# Patient Record
Sex: FEMALE | Race: WHITE | NOT HISPANIC OR LATINO | Employment: OTHER | URBAN - NONMETROPOLITAN AREA
[De-identification: names, ages, dates, MRNs, and addresses within clinical notes are randomized per-mention and may not be internally consistent; named-entity substitution may affect disease eponyms.]

---

## 2022-07-07 ENCOUNTER — OFFICE VISIT (OUTPATIENT)
Dept: FAMILY MEDICINE CLINIC | Facility: CLINIC | Age: 67
End: 2022-07-07

## 2022-07-07 VITALS
RESPIRATION RATE: 16 BRPM | BODY MASS INDEX: 30.87 KG/M2 | HEART RATE: 72 BPM | HEIGHT: 63 IN | WEIGHT: 174.2 LBS | DIASTOLIC BLOOD PRESSURE: 90 MMHG | TEMPERATURE: 97.8 F | OXYGEN SATURATION: 97 % | SYSTOLIC BLOOD PRESSURE: 124 MMHG

## 2022-07-07 DIAGNOSIS — Z12.11 ENCOUNTER FOR SCREENING FOR MALIGNANT NEOPLASM OF COLON: ICD-10-CM

## 2022-07-07 DIAGNOSIS — N02.8 IGA NEPHROPATHY: ICD-10-CM

## 2022-07-07 DIAGNOSIS — Z13.820 SCREENING FOR OSTEOPOROSIS: ICD-10-CM

## 2022-07-07 DIAGNOSIS — Z12.31 ENCOUNTER FOR SCREENING MAMMOGRAM FOR BREAST CANCER: ICD-10-CM

## 2022-07-07 DIAGNOSIS — E78.5 DYSLIPIDEMIA: ICD-10-CM

## 2022-07-07 DIAGNOSIS — Z00.00 INITIAL MEDICARE ANNUAL WELLNESS VISIT: Primary | ICD-10-CM

## 2022-07-07 DIAGNOSIS — F51.01 PRIMARY INSOMNIA: ICD-10-CM

## 2022-07-07 DIAGNOSIS — N39.46 MIXED STRESS AND URGE INCONTINENCE: ICD-10-CM

## 2022-07-07 DIAGNOSIS — F43.23 ADJUSTMENT DISORDER WITH MIXED ANXIETY AND DEPRESSED MOOD: ICD-10-CM

## 2022-07-07 DIAGNOSIS — N32.81 OVERACTIVE BLADDER: ICD-10-CM

## 2022-07-07 DIAGNOSIS — F39 MOOD DISORDER: ICD-10-CM

## 2022-07-07 DIAGNOSIS — R30.0 DYSURIA: ICD-10-CM

## 2022-07-07 DIAGNOSIS — M85.88 OSTEOPENIA OF LUMBAR SPINE: ICD-10-CM

## 2022-07-07 PROBLEM — N02.B9 IGA NEPHROPATHY: Status: ACTIVE | Noted: 2022-07-07

## 2022-07-07 LAB
BILIRUB BLD-MCNC: NEGATIVE MG/DL
CLARITY, POC: ABNORMAL
COLOR UR: ABNORMAL
EXPIRATION DATE: ABNORMAL
GLUCOSE UR STRIP-MCNC: NEGATIVE MG/DL
KETONES UR QL: NEGATIVE
LEUKOCYTE EST, POC: NEGATIVE
Lab: ABNORMAL
NITRITE UR-MCNC: NEGATIVE MG/ML
PH UR: 6 [PH] (ref 5–8)
PROT UR STRIP-MCNC: ABNORMAL MG/DL
RBC # UR STRIP: NEGATIVE /UL
SP GR UR: 1.02 (ref 1–1.03)
UROBILINOGEN UR QL: NORMAL

## 2022-07-07 PROCEDURE — G0438 PPPS, INITIAL VISIT: HCPCS | Performed by: FAMILY MEDICINE

## 2022-07-07 PROCEDURE — 1159F MED LIST DOCD IN RCRD: CPT | Performed by: FAMILY MEDICINE

## 2022-07-07 PROCEDURE — 96160 PT-FOCUSED HLTH RISK ASSMT: CPT | Performed by: FAMILY MEDICINE

## 2022-07-07 PROCEDURE — 1170F FXNL STATUS ASSESSED: CPT | Performed by: FAMILY MEDICINE

## 2022-07-07 PROCEDURE — 81003 URINALYSIS AUTO W/O SCOPE: CPT | Performed by: FAMILY MEDICINE

## 2022-07-07 PROCEDURE — 99203 OFFICE O/P NEW LOW 30 MIN: CPT | Performed by: FAMILY MEDICINE

## 2022-07-07 RX ORDER — LAMOTRIGINE 100 MG/1
100 TABLET ORAL 2 TIMES DAILY
COMMUNITY
End: 2022-07-07 | Stop reason: SDUPTHER

## 2022-07-07 RX ORDER — LAMOTRIGINE 100 MG/1
100 TABLET ORAL 2 TIMES DAILY
Qty: 180 TABLET | Refills: 3 | Status: SHIPPED | OUTPATIENT
Start: 2022-07-07 | End: 2022-10-10 | Stop reason: SDUPTHER

## 2022-07-07 RX ORDER — TEMAZEPAM 30 MG/1
30 CAPSULE ORAL NIGHTLY PRN
COMMUNITY
End: 2022-07-07 | Stop reason: SDUPTHER

## 2022-07-07 RX ORDER — EZETIMIBE 10 MG/1
10 TABLET ORAL DAILY
COMMUNITY
End: 2022-07-07 | Stop reason: SDUPTHER

## 2022-07-07 RX ORDER — TRAZODONE HYDROCHLORIDE 100 MG/1
100 TABLET ORAL NIGHTLY
COMMUNITY
End: 2022-07-07

## 2022-07-07 RX ORDER — EZETIMIBE 10 MG/1
10 TABLET ORAL DAILY
Qty: 90 TABLET | Refills: 3 | Status: SHIPPED | OUTPATIENT
Start: 2022-07-07

## 2022-07-07 RX ORDER — TEMAZEPAM 30 MG/1
30 CAPSULE ORAL NIGHTLY PRN
Qty: 90 CAPSULE | Refills: 1 | Status: SHIPPED | OUTPATIENT
Start: 2022-07-07 | End: 2022-12-27

## 2022-07-07 RX ORDER — ESCITALOPRAM OXALATE 10 MG/1
10 TABLET ORAL DAILY
Qty: 90 TABLET | Refills: 3 | Status: SHIPPED | OUTPATIENT
Start: 2022-07-07

## 2022-07-07 NOTE — PATIENT INSTRUCTIONS
Advance Care Planning and Advance Directives     You make decisions on a daily basis - decisions about where you want to live, your career, your home, your life. Perhaps one of the most important decisions you face is your choice for future medical care. Take time to talk with your family and your healthcare team and start planning today.  Advance Care Planning is a process that can help you:  · Understand possible future healthcare decisions in light of your own experiences  · Reflect on those decision in light of your goals and values  · Discuss your decisions with those closest to you and the healthcare professionals that care for you  · Make a plan by creating a document that reflects your wishes    Surrogate Decision Maker  In the event of a medical emergency, which has left you unable to communicate or to make your own decisions, you would need someone to make decisions for you.  It is important to discuss your preferences for medical treatment with this person while you are in good health.     Qualities of a surrogate decision maker:  • Willing to take on this role and responsibility  • Knows what you want for future medical care  • Willing to follow your wishes even if they don't agree with them  • Able to make difficult medical decisions under stressful circumstances    Advance Directives  These are legal documents you can create that will guide your healthcare team and decision maker(s) when needed. These documents can be stored in the electronic medical record.    · Living Will - a legal document to guide your care if you have a terminal condition or a serious illness and are unable to communicate. States vary by statute in document names/types, but most forms may include one or more of the following:        -  Directions regarding life-prolonging treatments        -  Directions regarding artificially provided nutrition/hydration        -  Choosing a healthcare decision maker        -  Direction  regarding organ/tissue donation    · Durable Power of  for Healthcare - this document names an -in-fact to make medical decisions for you, but it may also allow this person to make personal and financial decisions for you. Please seek the advice of an  if you need this type of document.    **Advance Directives are not required and no one may discriminate against you if you do not sign one.    Medical Orders  Many states allow specific forms/orders signed by your physician to record your wishes for medical treatment in your current state of health. This form, signed in personal communication with your physician, addresses resuscitation and other medical interventions that you may or may not want.      For more information or to schedule a time with a [facility] Advance Care Planning Facilitator contact: [facility], [place], operated by [employer] or call [phone] and someone will contact you directly.    Medicare Wellness  Personal Prevention Plan of Service     Date of Office Visit:    Encounter Provider:  Tommy Garces DO  Place of Service:  [facility]  Patient Name: Pura Carlisle  :  1955    As part of the Medicare Wellness portion of your visit today, we are providing you with this personalized preventive plan of services (PPPS). This plan is based upon recommendations of the [place] Preventive Services Task Force (USPSTF) and the Advisory Committee on Immunization Practices (ACIP).    This lists the preventive care services that should be considered, and provides dates of when you are due. Items listed as completed are up-to-date and do not require any further intervention.    Health Maintenance   Topic Date Due   • MAMMOGRAM  Never done   • URINE MICROALBUMIN  Never done   • DXA SCAN  Never done   • COLORECTAL CANCER SCREENING  Never done   • Pneumococcal Vaccine 65+ (1 - PCV) Never done   • TDAP/TD VACCINES (1 - Tdap) Never done   • ZOSTER VACCINE (1  of 2) Never done   • COVID-19 Vaccine (3 - Booster for Pfizer series) 08/01/2021   • ANNUAL WELLNESS VISIT  Never done   • DIABETIC FOOT EXAM  Never done   • HEMOGLOBIN A1C  07/07/2022   • DIABETIC EYE EXAM  Never done   • INFLUENZA VACCINE  10/01/2022   • HEPATITIS C SCREENING  Completed       Orders Placed This Encounter   Procedures   • Mammo Screening Digital Tomosynthesis Bilateral With CAD     Use HCPCS/CPT Code 92793 as an add-on code to 57246 when tomosynthesis is used in addition to 2-D mammography     Standing Status:   Future     Standing Expiration Date:   7/7/2023     Order Specific Question:   Reason for Exam:     Answer:   screening mammography     Order Specific Question:   Release to patient     Answer:   Immediate   • Cologuard - Stool, Per Rectum     Standing Status:   Future     Standing Expiration Date:   7/7/2023     Order Specific Question:   Release to patient     Answer:   Immediate       No follow-ups on file.

## 2022-07-07 NOTE — PROGRESS NOTES
The ABCs of the Annual Wellness Visit  Initial Medicare Wellness Visit    Chief Complaint   Patient presents with   • Med Refill     ESTABLISH CARE    • Medicare Wellness-Initial Visit     Subjective   History of Present Illness:  Pura Brown is a 66 y.o. female who presents for an Initial Medicare Wellness Visit.    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  She recently moved here from [place], to be closer to her children and grandchildren.  She is recently , he needs to deal with some of the situational stress associated with that, including adjustment disorder.  She does has both depressive and anxiety symptoms at times.  She attributes this to her new lifestyle, new location and establishment within the community.    Patient has significant history of injury to her right rotator cuff, requiring surgical intervention, as well as development of severe adhesive capsulitis and subsequent intervention for that additionally.  She has been left with significant reduction in her range of motion to the right upper extremity at the shoulder, she is right arm dominant.  She denies any new injuries or trauma.  This has led to some difficulties with acquiring a new job.    Patient has a longstanding history of mood disorder, has been on Lamictal for extended period of time.  She reports relative stability to her severe fluctuations to her mood with its use, however does continue to have some difficulties with sleep.  She denies any significant benefit to use of trazodone.  She has been utilizing 15 mg temazepam nightly.  It has had minimal improvement additionally.    Patient has a significant history of IgA nephropathy, first diagnosed in 1995.  Is been slowly progressive.    She reports a known history of osteopenia of her lumbar spine, she is due for bone density evaluation.  Patient states her last mammogram was just over 1 year ago, she would like to be  scheduled for updated mammography.  She does utilize self breast exams routinely.  Has a history of lumpectomy, pathology was benign.    Patient reports 3-5 episodes of urination nightly.  It has been significantly problematic with her ability to sleep.  She reports mixed stress/urge incontinence additionally.  Denies hematuria.  Patient states her quality of life has been significantly limited secondary to the incontinence as well as nocturia frequency.    Patient has a history of recurrent pancreatitis, secondary to dyslipidemia and statin use.  Statins were discontinued, and subsequently started on Zetia which has provided significant improvement to her dyslipidemia.  No recent exacerbations of pancreatitis.    The following portions of the patient's history were reviewed and   updated as appropriate: allergies, current medications, past family history, past medical history, past social history, past surgical history and problem list.     Compared to one year ago, the patient feels her physical   health is worse.    Compared to one year ago, the patient feels her mental   health is worse.    Recent Hospitalizations:  She was not admitted to the hospital during the last year.       Current Medical Providers:  Patient Care Team:  Tommy Garces DO as PCP - General (Family Medicine)    Outpatient Medications Prior to Visit   Medication Sig Dispense Refill   • ezetimibe (ZETIA) 10 MG tablet Take 10 mg by mouth Daily.     • lamoTRIgine (LaMICtal) 100 MG tablet Take 100 mg by mouth 2 (Two) Times a Day.     • temazepam (RESTORIL) 30 MG capsule Take 30 mg by mouth At Night As Needed for Sleep.     • traZODone (DESYREL) 100 MG tablet Take 100 mg by mouth Every Night.       No facility-administered medications prior to visit.       No opioid medication identified on active medication list. I have reviewed chart for other potential  high risk medication/s and harmful drug interactions in the elderly.          Aspirin is  "not on active medication list.  Aspirin use is not indicated based on review of current medical condition/s. Risk of harm outweighs potential benefits.  .    Patient Active Problem List   Diagnosis   • Primary insomnia   • Dyslipidemia   • IgA nephropathy   • Osteopenia of lumbar spine   • Screening for osteoporosis     Advance Care Planning  Advance Directive is not on file.  ACP discussion was held with the patient during this visit. Patient does not have an advance directive, information provided.    Review of systems:    1. Constitutional: Negative for fever. Negative for chills, diaphoresis, fatigue and unexpected weight change.   2. HENT: No dysphagia; no changes to vision/hearing/smell/taste; no epistaxis  3. Eyes: Negative for redness and visual disturbance.   4. Respiratory: negative for shortness of breath. Negative for chest pain . Negative for cough and chest tightness.   5. Cardiovascular: Negative for chest pain and palpitations.   6. Gastrointestinal: Negative for abdominal distention, abdominal pain and blood in stool.   7. Endocrine: Negative for cold intolerance and heat intolerance.   8. Genitourinary: As per above.  9. Musculoskeletal: Chronic arthralgias, back pain and myalgias.   10. Skin: Negative for color change, rash and wound.   11. Neurological: Negative for syncope, weakness and headaches.   12. Hematological: Negative for adenopathy. Does not bruise/bleed easily.   13. Psychiatric/Behavioral: Negative for confusion. The patient is not nervous/anxious.  Otherwise, as per above.          Objective       Vitals:    07/07/22 1129   BP: 124/90   Pulse: 72   Resp: 16   Temp: 97.8 °F (36.6 °C)   SpO2: 97%   Weight: 79 kg (174 lb 3.2 oz)   Height: 160 cm (63\")     Estimated body mass index is 30.86 kg/m² as calculated from the following:    Height as of this encounter: 160 cm (63\").    Weight as of this encounter: 79 kg (174 lb 3.2 oz).    BMI is >= 30 and <35. (Class 1 Obesity). The following " options were offered after discussion;: weight loss educational material (shared in after visit summary), exercise counseling/recommendations and nutrition counseling/recommendations      Does the patient have evidence of cognitive impairment? No    Physical Exam        General Appearance: alert, oriented x 3, no acute distress.  Well-nourished and developed female.  Pleasant and interactive during questioning/examination.  Skin: warm and dry.   HEENT: Atraumatic.  pupils round and reactive to light and accommodation, oral mucosa pink and moist.  Nares patent without epistaxis.  External auditory canals are patent tympanic membranes intact.  Neck: supple, no JVD, trachea midline.  No thyromegaly  Lungs: CTA, unlabored breathing effort.  Heart: RRR, normal S1 and S2, no S3, no rub.  Abdomen: soft, non-tender, no palpable bladder, present bowel sounds to auscultation ×4.  No guarding or rigidity.  No CVA tenderness.  Postsurgical scarring noted.  Extremities: no clubbing, cyanosis or edema.  Significant impairment to range of motion testing in the right upper extremity at the shoulder; postsurgical scarring noted, normal deltoid tone.  Severe crepitance on A/P ROM testing.  Symmetric  strength bilaterally.  Normal extension/flexion of bilateral elbows, as well as supination/pronation of the forearms.  Ambulates independently.  No leg length discrepancies.  Koki/Sanders sign negative.  Quadriceps mechanism intact.  Normal symmetric dorsiflexion/plantarflexion of bilateral feet.  Neuro: normal speech and mental status.  Cranial nerves II through XII intact.  No anosmia. DTR 2+; proprioception intact.  No focal motor/sensory deficits.    HEALTH RISK ASSESSMENT    Smoking Status:  Social History     Tobacco Use   Smoking Status Never Smoker   Smokeless Tobacco Never Used     Alcohol Consumption:  Social History     Substance and Sexual Activity   Alcohol Use Never     Fall Risk Screen:    STEADI Fall Risk Assessment  was completed, and patient is at LOW risk for falls.Assessment completed on:7/7/2022    Depression Screen:   PHQ-2/PHQ-9 Depression Screening 7/7/2022   Little Interest or Pleasure in Doing Things 0-->not at all   Feeling Down, Depressed or Hopeless 0-->not at all   PHQ-9: Brief Depression Severity Measure Score 0       Health Habits and Functional and Cognitive Screening:  No flowsheet data found.    Age-appropriate Screening Schedule:  Refer to the list below for future screening recommendations based on patient's age, sex and/or medical conditions. Orders for these recommended tests are listed in the plan section. The patient has been provided with a written plan.    Health Maintenance   Topic Date Due   • MAMMOGRAM  Never done   • URINE MICROALBUMIN  Never done   • DXA SCAN  Never done   • TDAP/TD VACCINES (1 - Tdap) Never done   • ZOSTER VACCINE (1 of 2) Never done   • DIABETIC FOOT EXAM  Never done   • HEMOGLOBIN A1C  07/07/2022   • DIABETIC EYE EXAM  Never done   • INFLUENZA VACCINE  10/01/2022            Assessment & Plan   CMS Preventative Services Quick Reference  Risk Factors Identified During Encounter  Depression/Dysphoria  Inactivity/Sedentary  The above risks/problems have been discussed with the patient.  Follow up actions/plans if indicated are seen below in the Assessment/Plan Section.  Pertinent information has been shared with the patient in the After Visit Summary.    Diagnoses and all orders for this visit:    1. Initial Medicare annual wellness visit (Primary)    2. Encounter for screening mammogram for breast cancer  -     Mammo Screening Digital Tomosynthesis Bilateral With CAD; Future    3. Encounter for screening for malignant neoplasm of colon  -     Cancel: Cologuard - Stool, Per Rectum; Future    4. Dyslipidemia  -     ezetimibe (ZETIA) 10 MG tablet; Take 1 tablet by mouth Daily.  Dispense: 90 tablet; Refill: 3    5. Primary insomnia  -     temazepam (RESTORIL) 30 MG capsule; Take 1  capsule by mouth At Night As Needed for Sleep.  Dispense: 90 capsule; Refill: 1    6. Dysuria  -     POCT urinalysis dipstick, automated    7. IgA nephropathy    8. Osteopenia of lumbar spine  -     Dexa Bone Density, Axial (Every 2 Years); Future    9. Screening for osteoporosis  -     Dexa Bone Density, Axial (Every 2 Years); Future    10. Adjustment disorder with mixed anxiety and depressed mood  -     escitalopram (Lexapro) 10 MG tablet; Take 1 tablet by mouth Daily.  Dispense: 90 tablet; Refill: 3    11. Mood disorder (HCC)  -     lamoTRIgine (LaMICtal) 100 MG tablet; Take 1 tablet by mouth 2 (Two) Times a Day.  Dispense: 180 tablet; Refill: 3    12. Overactive bladder  -     Mirabegron ER (Myrbetriq) 25 MG tablet sustained-release 24 hour 24 hr tablet; Take 1 tablet by mouth Daily.  Dispense: 90 tablet; Refill: 2    13. Mixed stress and urge incontinence  -     Mirabegron ER (Myrbetriq) 25 MG tablet sustained-release 24 hour 24 hr tablet; Take 1 tablet by mouth Daily.  Dispense: 90 tablet; Refill: 2        Follow Up:  Return in about 3 months (around 10/7/2022) for Recheck, Med Change/New Meds.     An After Visit Summary and PPPS were made available to the patient.    Adding myrbetriq; will follow clinically.    Continue ROM exercise program for R shoulder adhesive capsulitis.    Bone density ordered.    Colonoscopy completed last year at [facility] in [place], [place]; will request results.    Star escitalopram given situational stressors; will follow clinically.  Stop trazodone; inc dose of Temazepam.    Refilled medications today.    Mammogram ordered.    F/U in 3 months for eval of new medications.    Patient has been erroneously marked as diabetic. Based on the available clinical information, she does not have diabetes and should therefore be excluded from diabetic health maintenance and quality measures for the remainder of the reporting period.    I have discussed age/gender specific  preventative healthcare issues in detail with patient today.  I have answered all of the questions.    Discussed need for reduction in sodium/salt/caffeine intake; improve sleep habits as able; inc formal CV exercise program with goal of vigorous activity most, if not all, days of the week; goal of 250 min of sustained HR CV exercise total per week.    Discussed need for stress/anxiety reducing techniques such as prayer/meditation/breathing and counting exercises and avoidance of stress producing environments/situations; will follow clinically.      I spent 60 minutes caring for Pura on this date of service; this includes 25 minutes spent in management of her Medicare wellness issues/concerns, as well as an additional 35 minutes spent in management of her overactive bladder, mixed stress and urge incontinence, situational stress disorder, insomnia, dysuria and dyslipidemia. This time includes time spent by me in the following activities:preparing for the visit, reviewing tests, performing a medically appropriate examination and/or evaluation , counseling and educating the patient/family/caregiver, ordering medications, tests, or procedures, documenting information in the medical record and care coordination

## 2022-07-14 ENCOUNTER — TELEPHONE (OUTPATIENT)
Dept: FAMILY MEDICINE CLINIC | Facility: CLINIC | Age: 67
End: 2022-07-14

## 2022-07-14 ENCOUNTER — HOSPITAL ENCOUNTER (OUTPATIENT)
Dept: MAMMOGRAPHY | Facility: HOSPITAL | Age: 67
Discharge: HOME | End: 2022-07-14

## 2022-07-14 ENCOUNTER — APPOINTMENT (OUTPATIENT)
Dept: BONE DENSITY | Facility: HOSPITAL | Age: 67
End: 2022-07-14

## 2022-07-14 DIAGNOSIS — Z13.820 SCREENING FOR OSTEOPOROSIS: ICD-10-CM

## 2022-07-14 DIAGNOSIS — M85.88 OSTEOPENIA OF LUMBAR SPINE: ICD-10-CM

## 2022-07-14 DIAGNOSIS — Z12.31 ENCOUNTER FOR SCREENING MAMMOGRAM FOR BREAST CANCER: ICD-10-CM

## 2022-07-14 PROCEDURE — 77063 BREAST TOMOSYNTHESIS BI: CPT

## 2022-07-14 PROCEDURE — 77067 SCR MAMMO BI INCL CAD: CPT

## 2022-07-14 PROCEDURE — 77080 DXA BONE DENSITY AXIAL: CPT

## 2022-07-14 NOTE — TELEPHONE ENCOUNTER
Pharmacy Name:  [facility]     Pharmacy representative name: ALDA    Pharmacy representative phone number: [phone]    What medication are you calling in regards to: OXYBUTYNIN 5 MG    What question does the pharmacy have: THIS IS A MEDICATION REQUEST FROM PATIENT    Who is the provider that prescribed the medication: DR GUTIERREZ    Additional notes:

## 2022-08-10 ENCOUNTER — TELEPHONE (OUTPATIENT)
Dept: FAMILY MEDICINE CLINIC | Facility: CLINIC | Age: 67
End: 2022-08-10

## 2022-08-10 NOTE — TELEPHONE ENCOUNTER
DR LUPE ZIMMERMAN'S OFFICE IS REQUIRING A SIGNED MARILEE TO GET RECORDS FROM THEM. I COULD NOT FIND ANY IN PATIENTS CHART. LM FOR HER TO COME IN AND SIGN MARILEE (ALSO NEED TO SPECIFY ON FORM WHICH RECORDS WE ARE REQUESTING).  # [phone], FAX# [phone]. (ALL I HAD WAS A POST IT NOTE LEFT ON MY DESK WITH HER NAME & WHERE IT WAS NEEDED FROM)

## 2022-08-12 ENCOUNTER — TELEPHONE (OUTPATIENT)
Dept: FAMILY MEDICINE CLINIC | Facility: CLINIC | Age: 67
End: 2022-08-12

## 2022-08-22 RX ORDER — TOLTERODINE 4 MG/1
4 CAPSULE, EXTENDED RELEASE ORAL DAILY
Qty: 90 CAPSULE | Refills: 1 | Status: SHIPPED | OUTPATIENT
Start: 2022-08-22 | End: 2022-10-10

## 2022-10-10 ENCOUNTER — OFFICE VISIT (OUTPATIENT)
Dept: FAMILY MEDICINE CLINIC | Facility: CLINIC | Age: 67
End: 2022-10-10

## 2022-10-10 VITALS
HEART RATE: 64 BPM | BODY MASS INDEX: 31.71 KG/M2 | RESPIRATION RATE: 14 BRPM | HEIGHT: 63 IN | TEMPERATURE: 98.5 F | DIASTOLIC BLOOD PRESSURE: 68 MMHG | OXYGEN SATURATION: 95 % | WEIGHT: 179 LBS | SYSTOLIC BLOOD PRESSURE: 122 MMHG

## 2022-10-10 DIAGNOSIS — E78.5 DYSLIPIDEMIA: ICD-10-CM

## 2022-10-10 DIAGNOSIS — N39.46 MIXED STRESS AND URGE INCONTINENCE: Primary | ICD-10-CM

## 2022-10-10 DIAGNOSIS — F39 MOOD DISORDER: ICD-10-CM

## 2022-10-10 DIAGNOSIS — F43.23 ADJUSTMENT DISORDER WITH MIXED ANXIETY AND DEPRESSED MOOD: ICD-10-CM

## 2022-10-10 DIAGNOSIS — F51.01 PRIMARY INSOMNIA: ICD-10-CM

## 2022-10-10 PROCEDURE — 99214 OFFICE O/P EST MOD 30 MIN: CPT | Performed by: FAMILY MEDICINE

## 2022-10-10 RX ORDER — TOLTERODINE 2 MG/1
2 CAPSULE, EXTENDED RELEASE ORAL DAILY
Qty: 90 CAPSULE | Refills: 1 | Status: SHIPPED | OUTPATIENT
Start: 2022-10-10 | End: 2023-03-06

## 2022-10-10 RX ORDER — LAMOTRIGINE 100 MG/1
200 TABLET ORAL 2 TIMES DAILY
Qty: 360 TABLET | Refills: 3 | Status: SHIPPED | OUTPATIENT
Start: 2022-10-10

## 2022-10-10 NOTE — PROGRESS NOTES
Established Patient        Chief Complaint:   Chief Complaint   Patient presents with   • Follow-up        Pura Brown is a 67 y.o. female    History of Present Illness:   Answers for HPI/ROS submitted by the patient on 10/3/2022  Please describe your symptoms.: None -follow up  Have you had these symptoms before?: No  How long have you been having these symptoms?: 1-4 days  Please list any medications you are currently taking for this condition.: Lamotrigine 400mg, Tamezapam 30mg, Escitalopram 10mg, Ezetimibe 10mg  Please describe any probable cause for these symptoms. : None  What is the primary reason for your visit?: Other    Here today in scheduled follow-up visit of her mixed stress/urge incontinence, adjustment disorder, mood disorder, insomnia and dyslipidemia.    Recently returned from a vacation to [place].  Continues to be bothered by incontinence symptoms, was unable to start medications prescribed previously due to excessive cost.  He denies any hematuria.  Symptoms do continue to create difficulties with sleep at night, as well as travel arrangements during the daytime hours.    She denies any chest pain, syncope, palpitations or vertigo.  Mood has been quite stable on current treatment regimen.  Sleeping well with temazepam.    Subjective     The following portions of the patient's history were reviewed and updated as appropriate: allergies, current medications, past family history, past medical history, past social history, past surgical history and problem list.    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 Constitutional: Negative for fever.  "Negative for chills, diaphoresis, fatigue and unexpected weight change.   2. HENT: No dysphagia; no changes to vision/hearing/smell/taste; no epistaxis  3. Eyes: Negative for redness and visual disturbance.   4. Respiratory: negative for shortness of breath. Negative for chest pain . Negative for cough and chest tightness.   5. Cardiovascular: Negative for chest pain and palpitations.   6. Gastrointestinal: Negative for abdominal distention, abdominal pain and blood in stool.   7. Endocrine: Negative for cold intolerance and heat intolerance.   8. Genitourinary: Chronic urge/stress incontinence.  Continued nocturia.  9. Musculoskeletal: Negative for arthralgias, back pain and myalgias.   10. Skin: Negative for color change, rash and wound.   11. Neurological: Negative for syncope, weakness and headaches.   12. Hematological: Negative for adenopathy. Does not bruise/bleed easily.   13. Psychiatric/Behavioral: Negative for confusion. The patient is not nervous/anxious.    Objective     Physical Exam   Vital Signs: /68   Pulse 64   Temp 98.5 °F (36.9 °C)   Resp 14   Ht 160 cm (63\")   Wt 81.2 kg (179 lb)   SpO2 95%   BMI 31.71 kg/m²   BMI is >= 30 and <35. (Class 1 Obesity). The following options were offered after discussion;: weight loss educational material (shared in after visit summary), exercise counseling/recommendations and nutrition counseling/recommendations    General Appearance: alert, oriented x 3, no acute distress.  Skin: warm and dry.   HEENT: Atraumatic.  pupils round and reactive to light and accommodation, oral mucosa pink and moist.  Nares patent without epistaxis.  External auditory canals are patent tympanic membranes intact.  Neck: supple, no JVD, trachea midline.  No thyromegaly  Lungs: CTA, unlabored breathing effort.  Heart: RRR, normal S1 and S2, no S3, no rub.  Abdomen: soft, non-tender, no palpable bladder, present bowel sounds to auscultation ×4.  No guarding or " rigidity.  Extremities: no clubbing, cyanosis or edema.  Good range of motion actively and passively.  Symmetric muscle strength and development  Neuro: normal speech and mental status.  Cranial nerves II through XII intact.  No anosmia. DTR 2+; proprioception intact.  No focal motor/sensory deficits.    Assessment and Plan      Assessment/Plan:   Diagnoses and all orders for this visit:    1. Mixed stress and urge incontinence (Primary)  -     tolterodine LA (Detrol LA) 2 MG 24 hr capsule; Take 1 capsule by mouth Daily.  Dispense: 90 capsule; Refill: 1    2. Adjustment disorder with mixed anxiety and depressed mood    3. Mood disorder (HCC)  -     lamoTRIgine (LaMICtal) 100 MG tablet; Take 2 tablets by mouth 2 (Two) Times a Day.  Dispense: 360 tablet; Refill: 3    4. Primary insomnia    5. Dyslipidemia      I have sent a prescription for tolterodine to her pharmacy.  It does appear that this medication is covered.  Plan to follow clinically, have asked that she notify the office should the cost remain prohibitive.    I have refilled patient's Lamictal dosing today.  Corrected the indications for 200 mg total twice daily dosing.  90-day supply sent to her pharmacy.    Continue temazepam use.    Continue low-cholesterol dietary intake, as well as current dosing of Zetia.    Discussed need for stress/anxiety reducing techniques such as prayer/meditation/breathing and counting exercises and avoidance of stress producing environments/situations; will follow clinically.    Vital signs demonstrate hemodynamic stability.    Will request results of most recent colonoscopy from [facility] system.      Discussion Summary:    I spent 30 minutes caring for Pura on this date of service. This time includes time spent by me in the following activities:preparing for the visit, performing a medically appropriate examination and/or evaluation , counseling and educating the patient/family/caregiver,  ordering medications, tests, or procedures, documenting information in the medical record and care coordination    Discussed plan of care in detail with pt today; pt verb understanding and agrees.  Follow up:  Return in about 6 months (around 4/10/2023) for Recheck.     There are no Patient Instructions on file for this visit.    Tommy Garces DO  10/10/22  11:18 EDT          Please note that portions of this note may have been completed with a voice recognition program. Efforts were made to edit the dictations, but occasionally words are mistranscribed.

## 2022-12-02 ENCOUNTER — TELEPHONE (OUTPATIENT)
Dept: FAMILY MEDICINE CLINIC | Facility: CLINIC | Age: 67
End: 2022-12-02

## 2022-12-02 NOTE — TELEPHONE ENCOUNTER
Caller: Pura Brown    Relationship to patient: Self    Best call back number: [phone]    What is the call regarding: PATIENT STATES THAT SHE GOT A MESSAGE FROM [employer] THAT SHE HAS A LETTER, BUT SHE CANNOT GET ON.  IS THERE ANYONE WHO COULD TELL HER WHAT THIS MIGHT BE ABOUT?  PLEASE ADVISE.

## 2022-12-20 ENCOUNTER — OFFICE VISIT (OUTPATIENT)
Dept: FAMILY MEDICINE CLINIC | Facility: CLINIC | Age: 67
End: 2022-12-20

## 2022-12-20 VITALS
RESPIRATION RATE: 16 BRPM | BODY MASS INDEX: 31.5 KG/M2 | HEIGHT: 63 IN | SYSTOLIC BLOOD PRESSURE: 140 MMHG | TEMPERATURE: 98 F | HEART RATE: 74 BPM | DIASTOLIC BLOOD PRESSURE: 86 MMHG | OXYGEN SATURATION: 97 % | WEIGHT: 177.8 LBS

## 2022-12-20 DIAGNOSIS — J01.10 ACUTE NON-RECURRENT FRONTAL SINUSITIS: Primary | ICD-10-CM

## 2022-12-20 DIAGNOSIS — R05.1 ACUTE COUGH: ICD-10-CM

## 2022-12-20 PROCEDURE — 99213 OFFICE O/P EST LOW 20 MIN: CPT | Performed by: NURSE PRACTITIONER

## 2022-12-20 RX ORDER — BROMPHENIRAMINE MALEATE, PSEUDOEPHEDRINE HYDROCHLORIDE, AND DEXTROMETHORPHAN HYDROBROMIDE 2; 30; 10 MG/5ML; MG/5ML; MG/5ML
5 SYRUP ORAL 4 TIMES DAILY PRN
Qty: 118 ML | Refills: 0 | Status: SHIPPED | OUTPATIENT
Start: 2022-12-20 | End: 2023-02-07

## 2022-12-20 RX ORDER — AMOXICILLIN 500 MG/1
1000 CAPSULE ORAL 2 TIMES DAILY
Qty: 20 CAPSULE | Refills: 0 | Status: SHIPPED | OUTPATIENT
Start: 2022-12-20 | End: 2022-12-25

## 2022-12-20 NOTE — PROGRESS NOTES
"                      Established Patient        Chief Complaint:   Chief Complaint   Patient presents with   • Cough   • Sinusitis         History of Present Illness:    Pura Brown is a 67 y.o. female who presents today for concerns of cough, sinus infection. Onset of symptoms for 1 week. Patient also reports that she fell on stairs outside of home about 10 days ago and hit her head. Patient states that she presented to the ED the next day for vomiting and concerns of concussion. States that she was discharged from ED, no diagnosis of concussion. Reports low-grade fever since Sunday, max 100.2. Productive cough, pain in back during coughing.     Subjective     The following portions of the patient's history were reviewed and updated as appropriate: allergies, current medications, past family history, past medical history, past social history, past surgical history and problem list.    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 Negative for fatigue.   HENT: Positive for congestion, sinus pressure, sinus pain and sore throat.    Respiratory: Positive for cough. Negative for chest tightness and shortness of breath.    Cardiovascular: Negative for chest pain.   Musculoskeletal: Positive for back pain.   Neurological: Positive for headaches.   All other systems reviewed and are negative.      Objective     Vital Signs:   /86   Pulse 74   Temp 98 °F (36.7 °C)   Resp 16   Ht 160 cm (63\")   Wt 80.6 kg (177 lb 12.8 oz)   SpO2 97%   BMI 31.50 kg/m²     Physical Exam   Physical Exam  Vitals and nursing note reviewed.   HENT:      " Head: Normocephalic.      Nose: Nose normal.      Mouth/Throat:      Lips: Pink.   Eyes:      General: Lids are normal.      Conjunctiva/sclera: Conjunctivae normal.      Pupils: Pupils are equal, round, and reactive to light.   Cardiovascular:      Rate and Rhythm: Normal rate.      Heart sounds: Normal heart sounds.   Pulmonary:      Effort: Pulmonary effort is normal.      Breath sounds: Normal breath sounds.   Abdominal:      General: Bowel sounds are normal.   Musculoskeletal:         General: Normal range of motion.   Skin:     General: Skin is warm and dry.   Neurological:      Mental Status: She is alert and oriented to person, place, and time.      Gait: Gait is intact.   Psychiatric:         Attention and Perception: Attention normal.         Mood and Affect: Mood and affect normal.         Speech: Speech normal.         Behavior: Behavior normal. Behavior is cooperative.       Assessment and Plan      Assessment/Plan:   Diagnoses and all orders for this visit:    1. Acute non-recurrent frontal sinusitis (Primary)  -     brompheniramine-pseudoephedrine-DM 30-2-10 MG/5ML syrup; Take 5 mL by mouth 4 (Four) Times a Day As Needed for Congestion or Cough.  Dispense: 118 mL; Refill: 0  -     amoxicillin (AMOXIL) 500 MG capsule; Take 2 capsules by mouth 2 (Two) Times a Day for 5 days.  Dispense: 20 capsule; Refill: 0    2. Acute cough  -     brompheniramine-pseudoephedrine-DM 30-2-10 MG/5ML syrup; Take 5 mL by mouth 4 (Four) Times a Day As Needed for Congestion or Cough.  Dispense: 118 mL; Refill: 0      Discussion Summary:  Discussed plan of care in detail with pt today; pt verb understanding and agrees.    Follow up:  Return if symptoms worsen or fail to improve.     Patient Education:  There are no Patient Instructions on file for this visit.    MCKENNA Deleon  12/30/22  13:04 EST          Please note that portions of this note may have been completed with a voice recognition program.

## 2022-12-26 DIAGNOSIS — F51.01 PRIMARY INSOMNIA: ICD-10-CM

## 2022-12-27 RX ORDER — TEMAZEPAM 30 MG/1
30 CAPSULE ORAL NIGHTLY PRN
Qty: 30 CAPSULE | Refills: 1 | Status: SHIPPED | OUTPATIENT
Start: 2022-12-27

## 2022-12-27 NOTE — TELEPHONE ENCOUNTER
Rx Refill Note  Requested Prescriptions     Pending Prescriptions Disp Refills   • temazepam (RESTORIL) 30 MG capsule [Pharmacy Med Name: TEMAZEPAM 30 MG Capsule] 30 capsule 1     Sig: TAKE 1 CAPSULE BY MOUTH AT NIGHT AS NEEDED FOR SLEEP.      Last office visit with prescribing clinician: 10/10/2022   Last telemedicine visit with prescribing clinician: 4/10/2023   Next office visit with prescribing clinician: 4/10/2023                         Would you like a call back once the refill request has been completed: [] Yes [] No    If the office needs to give you a call back, can they leave a voicemail: [] Yes [] No    Bambi Shirley MA  12/27/22, 08:04 EST

## 2023-02-07 ENCOUNTER — OFFICE VISIT (OUTPATIENT)
Dept: FAMILY MEDICINE CLINIC | Facility: CLINIC | Age: 68
End: 2023-02-07
Payer: MEDICARE

## 2023-02-07 VITALS
OXYGEN SATURATION: 96 % | HEART RATE: 63 BPM | WEIGHT: 179.2 LBS | HEIGHT: 63 IN | TEMPERATURE: 98.3 F | DIASTOLIC BLOOD PRESSURE: 90 MMHG | BODY MASS INDEX: 31.75 KG/M2 | SYSTOLIC BLOOD PRESSURE: 132 MMHG

## 2023-02-07 DIAGNOSIS — B37.9 CANDIDA INFECTION: Primary | ICD-10-CM

## 2023-02-07 DIAGNOSIS — E66.09 CLASS 1 OBESITY DUE TO EXCESS CALORIES WITH SERIOUS COMORBIDITY AND BODY MASS INDEX (BMI) OF 31.0 TO 31.9 IN ADULT: ICD-10-CM

## 2023-02-07 PROCEDURE — 99213 OFFICE O/P EST LOW 20 MIN: CPT | Performed by: NURSE PRACTITIONER

## 2023-02-07 RX ORDER — LAMOTRIGINE 100 MG/1
2 TABLET ORAL
COMMUNITY
Start: 2022-10-10 | End: 2023-02-07

## 2023-02-07 RX ORDER — TOLTERODINE 2 MG/1
1 CAPSULE, EXTENDED RELEASE ORAL DAILY
COMMUNITY
Start: 2022-10-10 | End: 2023-02-07

## 2023-02-07 RX ORDER — FLUCONAZOLE 100 MG/1
100 TABLET ORAL DAILY
Qty: 2 TABLET | Refills: 0 | Status: SHIPPED | OUTPATIENT
Start: 2023-02-07

## 2023-02-07 NOTE — PROGRESS NOTES
"                      Established Patient        Chief Complaint:   Chief Complaint   Patient presents with   • Breast tenderness     In both breasts         History of Present Illness:    Pura Brown is a 67 y.o. female who presents today for complaints of bilateral breast tenderness. First noticed symptoms about 5 days ago. Patient usually wears a sports bra. Has gone without a bra since Thursday to see if symptoms improved but no improvement. Patient had rash between breasts about 1 month ago with red, itchy, raised bumps.     Drinks 2 cups of coffee daily. Denies any drainage from nipples    Subjective     The following portions of the patient's history were reviewed and updated as appropriate: allergies, current medications, past family history, past medical history, past social history, past surgical history and problem list.    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   Genitourinary: Negative for dyspareunia, pelvic pain, vaginal bleeding and vaginal pain.        Bilateral breast tenderness   Skin: Negative for color change and wound.       Objective     Vital Signs:   /90   Pulse 63   Temp 98.3 °F (36.8 °C)   Ht 160 cm (63\")   Wt 81.3 kg (179 lb 3.2 oz)   SpO2 96%   BMI 31.74 kg/m²     Physical Exam   Physical " Exam  Vitals and nursing note reviewed.   HENT:      Mouth/Throat:      Lips: Pink.   Eyes:      General: Lids are normal.   Cardiovascular:      Rate and Rhythm: Normal rate.      Heart sounds: Normal heart sounds.   Pulmonary:      Effort: Pulmonary effort is normal.      Breath sounds: Normal breath sounds.   Chest:   Breasts:     Right: Skin change and tenderness present. No swelling, bleeding, inverted nipple, mass or nipple discharge.      Left: Skin change and tenderness present. No swelling, bleeding, inverted nipple, mass or nipple discharge.          Comments: Bilateral breast tenderness, redness, dry, raised rash  Musculoskeletal:         General: Normal range of motion.   Neurological:      Mental Status: She is alert and oriented to person, place, and time.      Gait: Gait is intact.   Psychiatric:         Attention and Perception: Attention normal.         Mood and Affect: Mood and affect normal.         Speech: Speech normal.         Behavior: Behavior normal. Behavior is cooperative.         Assessment and Plan      Assessment/Plan:   Diagnoses and all orders for this visit:    1. Candida infection (Primary)  -     fluconazole (Diflucan) 100 MG tablet; Take 1 tablet by mouth Daily.  Dispense: 2 tablet; Refill: 0    2. Class 1 obesity due to excess calories with serious comorbidity and body mass index (BMI) of 31.0 to 31.9 in adult  Assessment & Plan:  Patient's (Body mass index is 31.74 kg/m².) indicates that they are obese (BMI >30) with health conditions that include none . Weight is unchanged. BMI  is above average; BMI management plan is completed. We discussed portion control and increasing exercise.         Discussion Summary:  Discussed plan of care in detail with pt today; pt verb understanding and agrees.    I spent 25 minutes caring for Pura on this date of service. This time includes time spent by me in the following activities:preparing for the visit, obtaining and/or reviewing a  separately obtained history, performing a medically appropriate examination and/or evaluation , counseling and educating the patient/family/caregiver, ordering medications, tests, or procedures, documenting information in the medical record and independently interpreting results and communicating that information with the patient/family/caregiver    Follow up:  Return for Next scheduled follow up.     Patient Education:  There are no Patient Instructions on file for this visit.    MCKENNA Deleon  02/21/23  21:32 EST          Please note that portions of this note may have been completed with a voice recognition program.   Answers for HPI/ROS submitted by the patient on 2/7/2023  Please describe your symptoms.: Breast tenderness around the nipple area on both breasts.  Have you had these symptoms before?: No  How long have you been having these symptoms?: 5-7 days  Please list any medications you are currently taking for this condition.: None  Please describe any probable cause for these symptoms. : I have no idea.  What is the primary reason for your visit?: Other

## 2023-02-21 PROBLEM — E66.09 CLASS 1 OBESITY DUE TO EXCESS CALORIES WITH SERIOUS COMORBIDITY AND BODY MASS INDEX (BMI) OF 31.0 TO 31.9 IN ADULT: Status: ACTIVE | Noted: 2023-02-21

## 2023-02-22 NOTE — ASSESSMENT & PLAN NOTE
Patient's (Body mass index is 31.74 kg/m².) indicates that they are obese (BMI >30) with health conditions that include none . Weight is unchanged. BMI  is above average; BMI management plan is completed. We discussed portion control and increasing exercise.

## 2023-03-02 DIAGNOSIS — Z12.31 ENCOUNTER FOR SCREENING MAMMOGRAM FOR MALIGNANT NEOPLASM OF BREAST: Primary | ICD-10-CM

## 2023-03-04 DIAGNOSIS — N39.46 MIXED STRESS AND URGE INCONTINENCE: ICD-10-CM

## 2023-03-06 RX ORDER — TOLTERODINE 2 MG/1
CAPSULE, EXTENDED RELEASE ORAL
Qty: 90 CAPSULE | Refills: 1 | Status: SHIPPED | OUTPATIENT
Start: 2023-03-06

## 2023-04-10 ENCOUNTER — OFFICE VISIT (OUTPATIENT)
Dept: FAMILY MEDICINE CLINIC | Facility: CLINIC | Age: 68
End: 2023-04-10
Payer: MEDICARE

## 2023-04-10 VITALS
HEART RATE: 85 BPM | TEMPERATURE: 97.1 F | OXYGEN SATURATION: 93 % | RESPIRATION RATE: 16 BRPM | DIASTOLIC BLOOD PRESSURE: 88 MMHG | BODY MASS INDEX: 31.89 KG/M2 | WEIGHT: 180 LBS | HEIGHT: 63 IN | SYSTOLIC BLOOD PRESSURE: 140 MMHG

## 2023-04-10 DIAGNOSIS — F51.01 PRIMARY INSOMNIA: ICD-10-CM

## 2023-04-10 DIAGNOSIS — E78.5 DYSLIPIDEMIA: ICD-10-CM

## 2023-04-10 DIAGNOSIS — M25.551 ARTHRALGIA OF RIGHT HIP: ICD-10-CM

## 2023-04-10 DIAGNOSIS — N39.46 MIXED STRESS AND URGE INCONTINENCE: ICD-10-CM

## 2023-04-10 DIAGNOSIS — F43.23 ADJUSTMENT DISORDER WITH MIXED ANXIETY AND DEPRESSED MOOD: Primary | ICD-10-CM

## 2023-04-10 DIAGNOSIS — F39 MOOD DISORDER: ICD-10-CM

## 2023-04-10 DIAGNOSIS — M70.61 GREATER TROCHANTERIC BURSITIS OF RIGHT HIP: ICD-10-CM

## 2023-04-10 PROCEDURE — 99214 OFFICE O/P EST MOD 30 MIN: CPT | Performed by: FAMILY MEDICINE

## 2023-04-10 RX ORDER — TEMAZEPAM 30 MG/1
30 CAPSULE ORAL NIGHTLY PRN
Qty: 30 CAPSULE | Refills: 1 | Status: SHIPPED | OUTPATIENT
Start: 2023-04-10

## 2023-04-10 RX ORDER — LAMOTRIGINE 200 MG/1
200 TABLET ORAL DAILY
Qty: 90 TABLET | Refills: 2 | Status: SHIPPED | OUTPATIENT
Start: 2023-04-10

## 2023-04-10 RX ORDER — SULINDAC 200 MG/1
TABLET ORAL
Qty: 35 TABLET | Refills: 0 | Status: SHIPPED | OUTPATIENT
Start: 2023-04-10

## 2023-04-10 NOTE — TELEPHONE ENCOUNTER
Rx Refill Note  Requested Prescriptions     Pending Prescriptions Disp Refills   • temazepam (RESTORIL) 30 MG capsule 30 capsule 1     Sig: Take 1 capsule by mouth At Night As Needed for Sleep.      Last office visit with prescribing clinician: 4/10/2023   Last telemedicine visit with prescribing clinician: Visit date not found   Next office visit with prescribing clinician: 10/10/2023                         Would you like a call back once the refill request has been completed: [] Yes [] No    If the office needs to give you a call back, can they leave a voicemail: [] Yes [] No    Terrie Navarro MA  04/10/23, 12:30 EDT

## 2023-04-10 NOTE — PROGRESS NOTES
Established Patient        Chief Complaint:   Chief Complaint   Patient presents with   • Urinary Incontinence        Pura Brown is a 67 y.o. female    History of Present Illness:   Here today in follow-up of her mood disorder, including adjustment disorder, mixed stress and urge incontinence, primary insomnia and dyslipidemia.    Accidental fall in December of 2022; fell onto right side; seen in ER, evaluated with numerous imaging; no acute fractures.    Continues to have right lateral hip discomfort.    Pt stopped detrol, intolerant to side effects and no sig improvement to symptoms of MUSI.    Denies chest pain, syncope, palpitations or vertigo.  Maintains an active lifestyle, balanced dietary intake.  Reports good hydration habits.  Denies hematuria.    Mood stable, patient is done well with current mood stabilizer regimen, she is currently only utilizing Lamictal once daily.  Subjective     The following portions of the patient's history were reviewed and updated as appropriate: allergies, current medications, past family history, past medical history, past social history, past surgical history and problem list.    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 Constitutional: Negative for fever. Negative for chills, diaphoresis, fatigue and unexpected weight change.   2. HENT: No  "dysphagia; no changes to vision/hearing/smell/taste; no epistaxis  3. Eyes: Negative for redness and visual disturbance.   4. Respiratory: negative for shortness of breath. Negative for chest pain . Negative for cough and chest tightness.   5. Cardiovascular: Negative for chest pain and palpitations.   6. Gastrointestinal: Negative for abdominal distention, abdominal pain and blood in stool.   7. Endocrine: Negative for cold intolerance and heat intolerance.   8. Genitourinary: Chronic urge/stress incontinence.  Continued nocturia.  9. Musculoskeletal: Right hip arthralgia as per above, without back pain and myalgias.   10. Skin: Negative for color change, rash and wound.   11. Neurological: Negative for syncope, weakness and headaches.   12. Hematological: Negative for adenopathy. Does not bruise/bleed easily.   13. Psychiatric/Behavioral: Negative for confusion. The patient is not nervous/anxious.    Objective     Physical Exam   Vital Signs: /88   Pulse 85   Temp 97.1 °F (36.2 °C)   Resp 16   Ht 160 cm (63\")   Wt 81.6 kg (180 lb)   SpO2 93%   BMI 31.89 kg/m²   BMI is >= 30 and <35. (Class 1 Obesity). The following options were offered after discussion;: weight loss educational material (shared in after visit summary), exercise counseling/recommendations and nutrition counseling/recommendations    General Appearance: alert, oriented x 3, no acute distress.  Skin: warm and dry.   HEENT: Atraumatic.  pupils round and reactive to light and accommodation, oral mucosa pink and moist.  Nares patent without epistaxis.  External auditory canals are patent tympanic membranes intact.  Neck: supple, no JVD, trachea midline.  No thyromegaly  Lungs: CTA, unlabored breathing effort.  Heart: RRR, normal S1 and S2, no S3, no rub.  Abdomen: soft, non-tender, no palpable bladder, present bowel sounds to auscultation ×4.  No guarding or rigidity.  Extremities: no clubbing, cyanosis or edema.  Good range of motion " actively and passively.  Symmetric muscle strength and development.  Focal tenderness overlying the right greater trochanter, no leg length discrepancies.  No abnormal rotation.  Quadriceps mechanism intact.  Normal dorsiflexion/plantarflexion of the feet.  Logroll negative.  Normal flexion/extension at the hip.  Neuro: normal speech and mental status.  Cranial nerves II through XII intact.  No anosmia. DTR 2+; proprioception intact.  No focal motor/sensory deficits.    Advance Care Planning   ACP discussion was held with the patient during this visit. Patient does not have an advance directive, information provided.       Assessment and Plan      Assessment/Plan:   Diagnoses and all orders for this visit:    1. Adjustment disorder with mixed anxiety and depressed mood (Primary)    2. Mood disorder  -     lamoTRIgine (LaMICtal) 200 MG tablet; Take 1 tablet by mouth Daily.  Dispense: 90 tablet; Refill: 2    3. Mixed stress and urge incontinence    4. Primary insomnia    5. Dyslipidemia    6. Arthralgia of right hip  -     sulindac (CLINORIL) 200 MG tablet; 1 tab po bid with meals x 14 days; then dec to 1 tab po daily with meal x 7 days; then STOP  Dispense: 35 tablet; Refill: 0    7. Greater trochanteric bursitis of right hip  -     sulindac (CLINORIL) 200 MG tablet; 1 tab po bid with meals x 14 days; then dec to 1 tab po daily with meal x 7 days; then STOP  Dispense: 35 tablet; Refill: 0    I have discussed ice compress therapy to the affected area with patient, to be done routinely.  She will utilize sulindac 200 mg twice daily for the first 14 days, to be taken with meals.  She would then decrease to once daily with meal for an additional 7 days.  Discontinue after that time.  Notify the office of any ill effects to the new medication.    Progressive improvement/increase in her activities, continue formal weightbearing activities particularly.  Notify the office should her symptoms fail to improve, or if they  worsen.    Patient has self adjusted her Lamictal dosing.  Seems to be doing well with current dose.  I have refilled for her today.    Continue temazepam and treatment of her insomnia.    Continue Zetia, as well as low-cholesterol dietary intake.  Surveillance labs at follow-up visit.    Vital signs demonstrate hemodynamic stability.    Discussed need for stress/anxiety reducing techniques such as prayer/meditation/breathing and counting exercises and avoidance of stress producing environments/situations; will follow clinically.      Discussion Summary:    Discussed plan of care in detail with pt today; pt verb understanding and agrees.    I spent 30 minutes caring for Pura on this date of service. This time includes time spent by me in the following activities:preparing for the visit, performing a medically appropriate examination and/or evaluation , counseling and educating the patient/family/caregiver, ordering medications, tests, or procedures, documenting information in the medical record and care coordination    I have reviewed and updated all copied forward information, as appropriate.  I attest to the accuracy and relevance of any unchanged information.    Follow up:  No follow-ups on file.     Patient Instructions   Hip Bursitis  Hip bursitis is swelling of one or more fluid-filled sacs (bursae) in your hip joint. This condition can cause pain, and your symptoms may come and go over time.  What are the causes?  • Repeated use of your hip muscles.  • Injury to the hip.  • Weak butt muscles.  • Bone spurs.  • Infection.  In some cases, the cause may not be known.  What increases the risk?  You are more likely to develop this condition if:  • You had a past hip injury or hip surgery.  • You have a condition, such as arthritis, gout, diabetes, or thyroid disease.  • You have spine problems.  • You have one leg that is shorter than the other.  • You run a lot or do long-distance running.  • You play sports  where there is a risk of injury or falling, such as football, martial arts, or skiing.  What are the signs or symptoms?  Symptoms may come and go, and they often include:  • Pain in the hip or groin area. Pain may get worse when you move your hip.  • Tenderness and swelling of the hip.  In rare cases, the bursa may become infected. If this happens, you may get a fever, as well as have warmth and redness in the hip area.  How is this treated?  This condition is treated by:  • Resting your hip.  • Icing your hip.  • Wrapping the hip area with an elastic bandage (compression wrap).  • Keeping the hip raised.  Other treatments may include medicine, draining fluid out of the bursa, or using crutches, a cane, or a walker. Surgery may be needed, but this is rare.  Long-term treatment may include doing exercises to help your strength and flexibility. It may also include lifestyle changes like losing weight to lessen the strain on your hip.  Follow these instructions at home:  Managing pain, stiffness, and swelling     • If told, put ice on the painful area.  ? Put ice in a plastic bag.  ? Place a towel between your skin and the bag.  ? Leave the ice on for 20 minutes, 2-3 times a day.  • Raise your hip by putting a pillow under your hips while you lie down. Stop if you feel pain.  • If told, put heat on the affected area. Do this as often as told by your doctor. Use a moist heat pack or a heating pad as told by your doctor.  ? Place a towel between your skin and the heat source.  ? Leave the heat on for 20-30 minutes.  ? Take off the heat if your skin turns bright red. This is very important if you are unable to feel pain, heat, or cold. You may have a greater risk of getting burned.  Activity  • Do not use your hip to support your body weight until your doctor says that you can.  • Use crutches, a cane, or a walker as told by your doctor.  • If the affected leg is one that you use to drive, ask your doctor if it is safe to  drive.  • Rest and protect your hip as much as you can until you feel better.  • Return to your normal activities as told by your doctor. Ask your doctor what activities are safe for you.  • Do exercises as told by your doctor.  General instructions  • Take over-the-counter and prescription medicines only as told by your doctor.  • Gently rub and stretch your injured area as often as is comfortable.  • Wear elastic bandages only as told by your doctor.  • If one of your legs is shorter than the other, get fitted for a shoe insert or orthotic.  • Keep a healthy weight. Follow instructions from your doctor.  • Keep all follow-up visits as told by your doctor. This is important.  How is this prevented?  • Exercise regularly, as told by your doctor.  • Wear the right shoes for the sport you play.  • Warm up and stretch before being active. Cool down and stretch after being active.  • Take breaks often from repeated activity.  • Avoid activities that bother your hip or cause pain.  • Avoid sitting down for a long time.  Where to find more information  • [employer]: orthoinfo.aaos.org  Contact a doctor if:  • You have a fever.  • You have new symptoms.  • You have trouble walking or doing everyday activities.  • You have pain that gets worse or does not get better with medicine.  • Your skin around your hip is red.  • You get a feeling of warmth in your hip area.  Get help right away if:  • You cannot move your hip.  • You have very bad pain.  • You cannot control the muscles in your feet.  Summary  • Hip bursitis is swelling of one or more fluid-filled sacs (bursae) in your hip joint.  • Symptoms often come and go over time.  • This condition is often treated by resting and icing the hip. It also may help to keep the area raised and wrapped in an elastic bandage. Other treatments may be needed.  This information is not intended to replace advice given to you by your health care provider.  Make sure you discuss any questions you have with your health care provider.      Tommy Garces,   04/10/23  21:57 EDT

## 2023-04-11 NOTE — PATIENT INSTRUCTIONS
Hip Bursitis  Hip bursitis is swelling of one or more fluid-filled sacs (bursae) in your hip joint. This condition can cause pain, and your symptoms may come and go over time.  What are the causes?  Repeated use of your hip muscles.  Injury to the hip.  Weak butt muscles.  Bone spurs.  Infection.  In some cases, the cause may not be known.  What increases the risk?  You are more likely to develop this condition if:  You had a past hip injury or hip surgery.  You have a condition, such as arthritis, gout, diabetes, or thyroid disease.  You have spine problems.  You have one leg that is shorter than the other.  You run a lot or do long-distance running.  You play sports where there is a risk of injury or falling, such as football, martial arts, or skiing.  What are the signs or symptoms?  Symptoms may come and go, and they often include:  Pain in the hip or groin area. Pain may get worse when you move your hip.  Tenderness and swelling of the hip.  In rare cases, the bursa may become infected. If this happens, you may get a fever, as well as have warmth and redness in the hip area.  How is this treated?  This condition is treated by:  Resting your hip.  Icing your hip.  Wrapping the hip area with an elastic bandage (compression wrap).  Keeping the hip raised.  Other treatments may include medicine, draining fluid out of the bursa, or using crutches, a cane, or a walker. Surgery may be needed, but this is rare.  Long-term treatment may include doing exercises to help your strength and flexibility. It may also include lifestyle changes like losing weight to lessen the strain on your hip.  Follow these instructions at home:  Managing pain, stiffness, and swelling     If told, put ice on the painful area.  Put ice in a plastic bag.  Place a towel between your skin and the bag.  Leave the ice on for 20 minutes, 2-3 times a day.  Raise your hip by putting a pillow under your hips while you lie down. Stop if you feel  pain.  If told, put heat on the affected area. Do this as often as told by your doctor. Use a moist heat pack or a heating pad as told by your doctor.  Place a towel between your skin and the heat source.  Leave the heat on for 20-30 minutes.  Take off the heat if your skin turns bright red. This is very important if you are unable to feel pain, heat, or cold. You may have a greater risk of getting burned.  Activity  Do not use your hip to support your body weight until your doctor says that you can.  Use crutches, a cane, or a walker as told by your doctor.  If the affected leg is one that you use to drive, ask your doctor if it is safe to drive.  Rest and protect your hip as much as you can until you feel better.  Return to your normal activities as told by your doctor. Ask your doctor what activities are safe for you.  Do exercises as told by your doctor.  General instructions  Take over-the-counter and prescription medicines only as told by your doctor.  Gently rub and stretch your injured area as often as is comfortable.  Wear elastic bandages only as told by your doctor.  If one of your legs is shorter than the other, get fitted for a shoe insert or orthotic.  Keep a healthy weight. Follow instructions from your doctor.  Keep all follow-up visits as told by your doctor. This is important.  How is this prevented?  Exercise regularly, as told by your doctor.  Wear the right shoes for the sport you play.  Warm up and stretch before being active. Cool down and stretch after being active.  Take breaks often from repeated activity.  Avoid activities that bother your hip or cause pain.  Avoid sitting down for a long time.  Where to find more information  [employer]: orthoinfo.aaos.org  Contact a doctor if:  You have a fever.  You have new symptoms.  You have trouble walking or doing everyday activities.  You have pain that gets worse or does not get better with medicine.  Your skin  around your hip is red.  You get a feeling of warmth in your hip area.  Get help right away if:  You cannot move your hip.  You have very bad pain.  You cannot control the muscles in your feet.  Summary  Hip bursitis is swelling of one or more fluid-filled sacs (bursae) in your hip joint.  Symptoms often come and go over time.  This condition is often treated by resting and icing the hip. It also may help to keep the area raised and wrapped in an elastic bandage. Other treatments may be needed.  This information is not intended to replace advice given to you by your health care provider. Make sure you discuss any questions you have with your health care provider.

## 2023-04-26 DIAGNOSIS — F43.23 ADJUSTMENT DISORDER WITH MIXED ANXIETY AND DEPRESSED MOOD: ICD-10-CM

## 2023-04-26 RX ORDER — ESCITALOPRAM OXALATE 10 MG/1
TABLET ORAL
Qty: 90 TABLET | Refills: 3 | Status: SHIPPED | OUTPATIENT
Start: 2023-04-26

## 2023-05-17 DIAGNOSIS — F51.01 PRIMARY INSOMNIA: ICD-10-CM

## 2023-05-17 RX ORDER — TEMAZEPAM 30 MG/1
30 CAPSULE ORAL NIGHTLY PRN
Qty: 30 CAPSULE | Refills: 1 | Status: SHIPPED | OUTPATIENT
Start: 2023-05-17 | End: 2023-05-18 | Stop reason: SDUPTHER

## 2023-05-17 NOTE — TELEPHONE ENCOUNTER
----- Message from Pura Brown sent at 5/16/2023  1:41 PM EDT -----  Regarding: Refill for test strips and needles   Contact: [phone]  I have an Accu-Chek Harriet Plus meter. I need test strips and soft click needles. Could you send a prescription to my [facility] please.   Also my Temazapam needs a refill update. Thanks,     Pura Brown

## 2023-05-17 NOTE — TELEPHONE ENCOUNTER
Daily Progress Note - Critical Care   Susan Dobbins 78 y o  male MRN: [id number]  Unit/Bed#: ICU 12 Encounter: [id number]        ----------------------------------------------------------------------------------------  HPI/24hr events:  Patient extubated yesterday without complication  Renal function continues to be tenuous, however no issues with hyperkalemia or persistent acidosis  Patient slept well overnight, however very confused and delirious this morning     ---------------------------------------------------------------------------------------  SUBJECTIVE    Review of Systems   Respiratory: Negative for shortness of breath  Cardiovascular: Negative for chest pain  Gastrointestinal: Positive for abdominal distention and abdominal pain  Negative for nausea  Psychiatric/Behavioral: The patient is nervous/anxious  Review of systems was unable to be performed secondary to Significant confusion/mental status change  ---------------------------------------------------------------------------------------  Assessment and Plan    Neuro:   · Acute metabolic encephalopathy  ? Likely multifactorial from sleep/wake cycle dysregulation, delirium, possible underlying cognitive decline  ? Sleep/wake cycle regulation  § Add melatonin qHS  § Encourage daytime wakefulness  ? CAM-ICU BID  ? Neuro checks routine  · Analgesia    ? Acetaminophen 975 mg q 8 hours  ? Oxycodone 2 5-5 mg q 4 hours PRN for moderate to severe pain  § 12 5 mg / 24 hours  ? Add 0 2 mg IV Dilaudid q6hr PRN for breakthrough pain     CV:   · Shock - resolved  · Hypertension  ? Resume amlodipine 2 5 mg daily  ? Continue continue arterial line  ? Maintain SBP < 180  ? Maintain MAP > 65  · Sick sinus syndrome s/p PPM  ? Amiodarone 200 mg daily  ? Continue close telemetry monitoring  ? Optimize electrolytes, Mag > 2 0, K > 4 0  · CAD s/p stent  ? Aspirin 81 mg daily  ?  Continue close telemetry monitoring     Pulm:  · Acute respiratory failure - Rx Refill Note  Requested Prescriptions     Pending Prescriptions Disp Refills   • temazepam (RESTORIL) 30 MG capsule 30 capsule 1     Sig: Take 1 capsule by mouth At Night As Needed for Sleep.      Last office visit with prescribing clinician: 4/10/2023   Last telemedicine visit with prescribing clinician: 4/26/2023   Next office visit with prescribing clinician: 10/10/2023                         Would you like a call back once the refill request has been completed: [] Yes [] No    If the office needs to give you a call back, can they leave a voicemail: [] Yes [] No    Terrie Navarro MA  05/17/23, 14:22 EDT   resolved  ? Patient extubated yesterday without complication  ? Maintain SpO2 > 88%  ? Encourage coughing and deep breathing, incentive spirometry q1hr while awake, head of bed > 30°        GI:   · Elevated LFTs  · Likely secondary to hypotension as well as some degree of congestive hepatopathy with volume overload  · Continues to improve, no intervention  · AST:  207-163  · ALT:  99-30  · Total bilirubin WNL  · Avoid hepatotoxic agents and hypotension  · Continue to maintain euvolemic to slightly negative fluid balance to offload hepatic congestion  · Ileus vs early obstruction  · Patient with history of [place]'s and colonic distention requiring decompressive colonoscopy  · Continue senna/Colace BID, MiraLax daily, suppository daily  · Consider obstruction series today with possible GI consult as patient may require another decompressive colonoscopy  · NPO at this time      :   · HARRIETT superimposed on CKD stage 5  ? Baseline creatinine:  3 20 - 4 30  ? Creatinine peak:  4 37  ? Current creatinine:  4 34  ? Nephrology consulted, appreciate recommendations  ? Maintain Lynch catheter for today given need for close I/O's  ? Strict q2h I/O monitoring  ? Trend renal function tests today   ? Of note, patient has stated in the past that he does not want dialysis should that be indicated  Consider palliative to help discuss overall goals   · S/p chronic left nephrostomy  ? Continue to monitor drain site, output amount and character     F/E/N:   · Hypernatremia  ? Likely secondary to volume resuscitation with normal saline and HARRIETT with ongoing sodium bicarb   ? D5+ 0 45 stopped yesterday, and Na slowly increasing   ? Can consider hypotonic fluids with diuresis, although given tenuous renal function, will defer to nephrology   ? Check BMP q 6 hours for today  · Hyperkalemia  ? Resolved, although trending up   ? Continue to monitor BMP  · Normal anion gap metabolic acidosis   ? Resolved  ?  Continue sodium bicarb tablets · Oropharyngeal dysphagia  ? SLP recommendations for level 1 pureed dysphagia diet with nectar thick liquids  ? Currently NPO given significant GI distention      Heme/Onc:   · Acute blood loss anemia  ? Superimposed on chronic anemia due to CLL, CKD, chronic disease  ? Baseline hemoglobin:  8 7-9 8  ? Admission hemoglobin:  10 5  ? Current hemoglobin:  7 1  ? S/p 6 units PRBC intraoperatively, 1 unit PRBC postoperatively  ? No sign of further, active bleeding  ? Transfuse for hemoglobin <7 0  · Thrombocytopenia  ? Baseline platelets WNL  ? Current platelet:  32,992  ? S/p 1 unit platelets  ? Maintain platelets > 18,612 or > 50,000 in the presence of bleeding  · CLL  ? Patient be monitored as outpatient by Hematology/Oncology  ? Known baseline leukocytosis which is higher than current WBC  ? No treatment at this time  · VTE prophylaxis:  Sub-q heparin TID  Of note, patient does have history of lower extremity DVT which was treated with a short course of Lovenox  It is unknown whether this was provoked  SCD's to BLE     Endo/Rheum:   · Type 2 diabetes  ? Last hemoglobin A1c 6 4 % on 11/19/19  ? Insulin drip started yesterday for persistent hyperglycemia while on hypotonic fluids  ? Insulin drip maintained overnight for slightly worsened renal function  ? Plan to transition off drip to sliding scale regimen today if patient remains off hypotonic IV fluids  ? Adjust insulin regimen as needed to maintain goal -180     ID:   · Leukocytosis  ? Likely chronic secondary to CLL, and overall below baseline  ? Patient received perioperative cefazolin  ? Continue to monitor fever and WBC curve off antibiotics  · History C diff  ? Prophylactic PO vancomycin 125 mg q 12 hours  ? Three more doses remaining  ? Continue for total of 48 hours     MSK/Skin:   · S/p fall with right periprosthetic hip fracture  ? POD # 2 s/p right revision MARCIN/ORIF  ? Weight bearing as tolerated right lower extremity  ?  PT/OT when medically appropriate  ? Orthopedic surgery following  · Right humerus fracture  ? Maintain sling to right upper extremity  ? Non weight-bearing right upper extremity  ? PT/OT  · Reposition q2h, eliminate pressure points while in bed  · Close skin surveillance, patient with multiple areas of blistering      Disposition: Transfer to Stepdown Level 1   Code Status: Level 1 - Full Code  ---------------------------------------------------------------------------------------  ICU CORE MEASURES    Prophylaxis   VTE Pharmacologic Prophylaxis: Heparin  VTE Mechanical Prophylaxis: sequential compression device  Stress Ulcer Prophylaxis: Prophylaxis Not Indicated     ABCDE Protocol (if indicated)  Plan to perform spontaneous awakening trial today? Not applicable  Plan to perform spontaneous breathing trial today? Not applicable  Obvious barriers to extubation? Not applicable  CAM-ICU: Positive    Invasive Devices Review  Invasive Devices     Peripheral Intravenous Line            Peripheral IV 01/05/20 Left Hand 1 day    Peripheral IV 01/05/20 Right;Ventral (anterior); Medial Wrist 1 day          Arterial Line            Arterial Line 01/05/20 Left Radial 1 day          Drain            Colostomy LUQ -- days    Colostomy Loop LUQ 1137 days    Nephrostomy Left 10 Fr  89 days    Nephrostomy Left 3 days    Urethral Catheter 1 day              Can any invasive devices be discontinued today? No (provide explanation): strict I/O  ---------------------------------------------------------------------------------------  OBJECTIVE    Physical Exam   Constitutional: He appears well-developed and well-nourished  He is cooperative  No distress  Nasal cannula in place  HENT:   Wound healing on top of scalp  Nasal deformity   Eyes: Pupils are equal, round, and reactive to light  No scleral icterus  Neck: Neck supple  Cardiovascular: Normal rate, regular rhythm and normal heart sounds     Pulses:       Radial pulses are 2+ on the right side, and 2+ on the left side  Dorsalis pedis pulses are 1+ on the right side, and 1+ on the left side  2+ lower extremity edema  Paced rhythm   Pulmonary/Chest: He has decreased breath sounds in the right lower field and the left lower field  He has no wheezes  He has no rhonchi  He has no rales  Strong, nonproductive cough   Abdominal: Soft  He exhibits distension  Bowel sounds are decreased  There is generalized tenderness  Distended, tympanic abdomen   Genitourinary:   Genitourinary Comments: Lynch:  Clear yellow urine  Left nephrostomy:  Clear yellow urine   Neurological: He is alert  GCS eye subscore is 4  GCS verbal subscore is 4  GCS motor subscore is 6  Disoriented to place and time   Skin: Skin is warm and dry  Capillary refill takes less than 2 seconds  He is not diaphoretic  Multiple areas of skin tears with weeping, blisters, ecchymosis       Vitals   Vitals:    20 0200 20 0300 20 0400 20 0500   BP:       Pulse: 68 70 62 64   Resp:       Temp:       TempSrc:       SpO2: 95% 94% 93% 96%   Weight:       Height:         Temp (24hrs), Av 5 °F (36 9 °C), Min:97 9 °F (36 6 °C), Max:99 °F (37 2 °C)  Current: Temperature: 98 8 °F (37 1 °C)  HR: 62 - 86  BP: 100/42 - 186/56  MAP: 68 - 97  SpO2: 88 - 100%      Height and Weights   Height: 5' 10" (177 8 cm)  IBW: 73 kg  Body mass index is 28 25 kg/m²    Weight (last 2 days)     Date/Time   Weight    20 1726   89 3 (196 87)    20 0544   89 3 (196 87)                Intake and Output  I/O       701 -  0700 701 -  07    I V  (mL/kg) 5527 6 (61 9) 505 2 (5 7)    Blood 4171     NG/GT 40     IV Piggyback 2200 100    Cell Saver 259     Total Intake(mL/kg) 20416 6 (136 6) 605 2 (6 8)    Urine (mL/kg/hr) 1450 (0 7) 2025 (0 9)    Emesis/NG output 50     Blood 1250     Total Output 2750     Net +9447 6 -1419 8                UOP: 80 - 85 ml/hr     Nutrition       Diet Orders   (From admission, onward)             Start     Ordered    01/07/20 0640  Diet NPO; Sips with meds  Diet effective now     Comments:  Meds: crushed with puree   Aspiration precautions and swallowing strategies: upright posture, only feed when fully alert, slow rate of feeding and small bites/sips   Question Answer Comment   Diet Type NPO    NPO Except: Sips with meds    RD to adjust diet per protocol? Yes        01/07/20 0640                  Laboratory and Diagnostics:  Results from last 7 days   Lab Units 01/07/20  0428 01/06/20  1647 01/06/20  0456 01/06/20  0255 01/05/20  2220 01/05/20 2010 01/05/20  1433  01/05/20  0525 01/04/20  1044  01/03/20  1524   WBC Thousand/uL 16 70* 15 91* 12 59* 12 22* 11 82* 10 76* 23 92*  --  26 27* 20 64*  --  21 91*   HEMOGLOBIN g/dL 7 1* 7 8* 7 4* 7 6* 7 5* 7 9* 9 4*  --  8 1* 9 2*  --  10 5*   I STAT HEMOGLOBIN   --   --   --   --   --   --   --    < >  --   --   --   --    HEMATOCRIT % 22 2* 23 6* 22 5* 23 1* 22 9* 24 3* 28 8*  --  27 3* 30 8*  --  34 7*   HEMATOCRIT, ISTAT   --   --   --   --   --   --   --    < >  --   --   --   --    PLATELETS Thousands/uL 93* 85* 79* 84* 53* 47* 58*  --  122* 147*   < > 187   BANDS PCT %  --   --  1 3 1 1  --   --   --   --   --   --    MONO PCT %  --   --  0* 3* 1* 4  --   --  3* 2*  --  8    < > = values in this interval not displayed       Results from last 7 days   Lab Units 01/07/20  0428 01/06/20  2140 01/06/20  1647 01/06/20  1005 01/06/20  0456 01/06/20  0256 01/05/20  2220   SODIUM mmol/L 146* 145 143 144 145 147* 147*   POTASSIUM mmol/L 4 8 4 7 4 7 4 5 4 8 4 9 5 2   CHLORIDE mmol/L 116* 115* 113* 117* 118* 119* 120*   CO2 mmol/L 22 22 20* 22 21 20* 21   ANION GAP mmol/L 8 8 10 5 6 8 6   BUN mg/dL 54* 53* 51* 50* 49* 48* 49*   CREATININE mg/dL 4 34* 4 32* 4 29* 4 29* 4 20* 4 17* 4 21*   CALCIUM mg/dL 7 9* 8 1* 8 6 7 8* 7 8* 7 4* 7 1*   GLUCOSE RANDOM mg/dL 121 144* 112 140 182* 208* 229*   ALT U/L 30  --   --   --  99*  --   --    AST U/L 163*  -- --   --  207*  --   --    ALK PHOS U/L 60  --   --   --  54  --   --    ALBUMIN g/dL 2 6*  --   --   --  2 8*  --   --    TOTAL BILIRUBIN mg/dL 0 55  --   --   --  0 75  --   --      Results from last 7 days   Lab Units 01/07/20  0428 01/06/20  0456 01/05/20  2220   MAGNESIUM mg/dL 2 4 2 5 1 9   PHOSPHORUS mg/dL 3 3 3 7 3 7      Results from last 7 days   Lab Units 01/05/20  2220 01/05/20  1638 01/04/20  1017   INR  1 44* 1 38* 1 23*   PTT seconds 33 32 38*          Results from last 7 days   Lab Units 01/05/20  2220   LACTIC ACID mmol/L 1 5     ABG:  Results from last 7 days   Lab Units 01/06/20  0456   PH ART  7 363   PCO2 ART mm Hg 33 6*   PO2 ART mm Hg 120 1   HCO3 ART mmol/L 18 7*   BASE EXC ART mmol/L -6 1   ABG SOURCE  Line, Arterial     VBG:  Results from last 7 days   Lab Units 01/06/20  0456   ABG SOURCE  Line, Arterial           Micro        EKG: V-paced   Imaging:  I have personally reviewed pertinent reports     and I have personally reviewed pertinent films in PACS    Active Medications  Scheduled Meds:  Current Facility-Administered Medications:  acetaminophen 975 mg Oral [facility] Heath Daley MD    amiodarone 200 mg Oral QPM Heath Daley MD    aspirin 81 mg Oral Daily BRENDON Oro    bisacodyl 10 mg Rectal Daily Elieser Gallegos PA-C    chlorhexidine 15 mL Swish & Spit Q12H [facility] Heath Daley MD    heparin (porcine) 5,000 Units Subcutaneous [facility] Heath Daley MD    HYDROmorphone 0 2 mg Intravenous Q6H PRN BRENDON Oro    insulin regular (HumuLIN R,NovoLIN R) infusion 0 3-21 Units/hr Intravenous Titrated BRENDON Oro Last Rate: 0 5 Units/hr (01/07/20 0006)   melatonin 6 mg Oral HS BRENDON Palma    ondansetron 4 mg Intravenous Q6H PRN Heath Daley MD    oxyCODONE 5 mg Oral Q4H PRN BRENDON Oro    Or        oxyCODONE 2 5 mg Oral Q4H PRN BRENDON Oro    polyethylene glycol 17 g Oral Daily Heath Daley MD    polyethylene glycol 17 g Oral Daily PRN BRENDON Blackwood    senna-docusate sodium 1 tablet Oral BID BRENDON Blackwood    sodium bicarbonate 1,300 mg Oral QAM Trevon Puente MD    sodium bicarbonate 650 mg Oral QPM Trevon Puente MD    sodium chloride (PF) 10 mL Intracatheter Daily Trevon Puente MD    vancomycin 125 mg Oral Q12H [address] BRENDON Palma      Continuous Infusions:    insulin regular (HumuLIN R,NovoLIN R) infusion 0 3-21 Units/hr Last Rate: 0 5 Units/hr (01/07/20 0006)     PRN Meds:     HYDROmorphone 0 2 mg Q6H PRN   ondansetron 4 mg Q6H PRN   oxyCODONE 5 mg Q4H PRN   Or     oxyCODONE 2 5 mg Q4H PRN   polyethylene glycol 17 g Daily PRN       Allergies   Allergies   Allergen Reactions    Bacitracin Other (See Comments)     Redness; irritation    Neosporin [Neomycin-Bacitracin Zn-Polymyx] Other (See Comments)     Redness; irritation         Counseling / Coordination of Care  Total Critical Care time spent 15 minutes excluding procedures, teaching and family updates  BRENDON Lozoya        Portions of the record may have been created with voice recognition software  Occasional wrong word or "sound a like" substitutions may have occurred due to the inherent limitations of voice recognition software    Read the chart carefully and recognize, using context, where substitutions have occurred

## 2023-05-24 DIAGNOSIS — E78.5 DYSLIPIDEMIA: ICD-10-CM

## 2023-05-24 RX ORDER — EZETIMIBE 10 MG/1
TABLET ORAL
Qty: 90 TABLET | Refills: 3 | Status: SHIPPED | OUTPATIENT
Start: 2023-05-24

## 2023-06-02 ENCOUNTER — HOSPITAL ENCOUNTER (OUTPATIENT)
Dept: MAMMOGRAPHY | Facility: HOSPITAL | Age: 68
Discharge: HOME | End: 2023-06-02

## 2023-06-02 DIAGNOSIS — Z12.31 ENCOUNTER FOR SCREENING MAMMOGRAM FOR MALIGNANT NEOPLASM OF BREAST: ICD-10-CM

## 2023-06-02 DIAGNOSIS — F51.01 PRIMARY INSOMNIA: ICD-10-CM

## 2023-06-02 RX ORDER — TEMAZEPAM 30 MG/1
CAPSULE ORAL
Qty: 30 CAPSULE | OUTPATIENT
Start: 2023-06-02

## 2023-06-16 ENCOUNTER — OFFICE VISIT (OUTPATIENT)
Dept: FAMILY MEDICINE CLINIC | Facility: CLINIC | Age: 68
End: 2023-06-16
Payer: MEDICARE

## 2023-06-16 ENCOUNTER — TELEPHONE (OUTPATIENT)
Dept: FAMILY MEDICINE CLINIC | Facility: CLINIC | Age: 68
End: 2023-06-16

## 2023-06-16 VITALS
HEART RATE: 53 BPM | WEIGHT: 183.5 LBS | OXYGEN SATURATION: 94 % | BODY MASS INDEX: 32.51 KG/M2 | SYSTOLIC BLOOD PRESSURE: 120 MMHG | HEIGHT: 63 IN | DIASTOLIC BLOOD PRESSURE: 82 MMHG

## 2023-06-16 DIAGNOSIS — N18.30 STAGE 3 CHRONIC KIDNEY DISEASE, UNSPECIFIED WHETHER STAGE 3A OR 3B CKD: ICD-10-CM

## 2023-06-16 DIAGNOSIS — I73.1 BUERGER'S DISEASE: ICD-10-CM

## 2023-06-16 DIAGNOSIS — R10.9 RIGHT FLANK PAIN: Primary | ICD-10-CM

## 2023-06-16 LAB
BILIRUB BLD-MCNC: NEGATIVE MG/DL
CLARITY, POC: CLEAR
COLOR UR: YELLOW
EXPIRATION DATE: ABNORMAL
GLUCOSE UR STRIP-MCNC: NEGATIVE MG/DL
KETONES UR QL: NEGATIVE
LEUKOCYTE EST, POC: NEGATIVE
Lab: ABNORMAL
NITRITE UR-MCNC: NEGATIVE MG/ML
PH UR: 6 [PH] (ref 5–8)
PROT UR STRIP-MCNC: ABNORMAL MG/DL
RBC # UR STRIP: NEGATIVE /UL
SP GR UR: 1.01 (ref 1–1.03)
UROBILINOGEN UR QL: ABNORMAL

## 2023-06-16 NOTE — PROGRESS NOTES
Established Patient        Chief Complaint:   Chief Complaint   Patient presents with    Flank Pain     Complaints of pain in kidney has BUERGER DISEASE. Patient states that this has been on going for 3-4 days.         History of Present Illness:    Pura Brown is a 67 y.o. female who presents today for complaints of right flank pain. Reports onset 3-4 days ago. States that at first she thought she had pulled a muscle but this morning when she got out of bed and stood up the pain was worse. Reports pain as a dull ache. Denies dysuria, urgency, frequency, hematuria. Patient was diagnosed with Buerger kidney disease years ago. Is currently in stage 3 kidney disease. Has not seen nephrologist in years.     Subjective     The following portions of the patient's history were reviewed and updated as appropriate: allergies, current medications, past family history, past medical history, past social history, past surgical history and problem list.    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   Respiratory:  Negative for cough and shortness of breath.    Cardiovascular:  Negative for chest pain and palpitations.   Gastrointestinal:  Negative for abdominal pain, constipation, diarrhea and nausea.   Genitourinary:   "Positive for flank pain (right side). Negative for difficulty urinating, dysuria, frequency, hematuria and urgency.   Neurological:  Negative for dizziness and weakness.   Psychiatric/Behavioral:  Negative for confusion and sleep disturbance.      Objective     Vital Signs:   /82   Pulse 53   Ht 160 cm (62.99\")   Wt 83.2 kg (183 lb 8 oz)   SpO2 94%   BMI 32.51 kg/m²     Physical Exam   Physical Exam  Vitals and nursing note reviewed.   HENT:      Mouth/Throat:      Lips: Pink.   Eyes:      General: Lids are normal.   Cardiovascular:      Rate and Rhythm: Normal rate and regular rhythm.      Heart sounds: Normal heart sounds.   Pulmonary:      Effort: Pulmonary effort is normal.      Breath sounds: Normal breath sounds.   Abdominal:      General: There is no distension.      Tenderness: There is no abdominal tenderness. There is right CVA tenderness. There is no guarding.   Musculoskeletal:      Thoracic back: Tenderness present.        Back:    Neurological:      Mental Status: She is alert and oriented to person, place, and time.      Gait: Gait is intact.   Psychiatric:         Attention and Perception: Attention normal.         Mood and Affect: Mood and affect normal.         Speech: Speech normal.         Behavior: Behavior normal. Behavior is cooperative.       Assessment and Plan      Assessment/Plan:   Diagnoses and all orders for this visit:    1. Right flank pain (Primary)  -     POCT urinalysis dipstick, automated  -     US Renal Bilateral; Future  -     Cancel: XR Abdomen KUB; Future  -     XR Abdomen KUB; Future    2. Buerger's disease  -     US Renal Bilateral; Future  -     XR Abdomen KUB; Future    3. Stage 3 chronic kidney disease, unspecified whether stage 3a or 3b CKD  -     US Renal Bilateral; Future        Discussion Summary:  Discussed plan of care in detail with pt today; pt verb understanding and agrees.      I spent 25 minutes caring for Pura on this date of service. This time " includes time spent by me in the following activities:preparing for the visit, obtaining and/or reviewing a separately obtained history, performing a medically appropriate examination and/or evaluation , counseling and educating the patient/family/caregiver, ordering medications, tests, or procedures, and documenting information in the medical record      I have reviewed and updated all copied forward information, as appropriate.  I attest to the accuracy and relevance of any unchanged information.      Follow up:  Return for Follow up with PCP if symptoms worsen or persist..     Patient Education:  There are no Patient Instructions on file for this visit.    MCKENNA Deleon  06/16/23  16:47 EDT          Please note that portions of this note may have been completed with a voice recognition program. Answers submitted by the patient for this visit:  Other (Submitted on 6/16/2023)  Please describe your symptoms.: Discomfort in approximate area of right kidney.  Have you had these symptoms before?: Yes  How long have you been having these symptoms?: 1-4 days  Please list any medications you are currently taking for this condition.: None  Please describe any probable cause for these symptoms. 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  Primary Reason for Visit (Submitted on 6/16/2023)  What is the primary reason for your visit?: Other

## 2023-06-16 NOTE — TELEPHONE ENCOUNTER
Caller: Pura Brown    Relationship to patient: Self    Best call back number:  [phone]  PLEASE CALL     Chief complaint: PATIENT HAS BUERGER DISEASE AND IS HAVING   PAIN IN  HER RIGHT KIDNEY FOR A COUPLE OF DAYS     Type of visit  OFFICE VISIT     Requested date:  AS SOON AS POSSIBLE     Additional notes: NO APPOINTMENTS AVAILABLE

## 2023-06-19 ENCOUNTER — TELEPHONE (OUTPATIENT)
Dept: FAMILY MEDICINE CLINIC | Facility: CLINIC | Age: 68
End: 2023-06-19

## 2023-06-19 NOTE — TELEPHONE ENCOUNTER
Caller: Lisa Brown    Relationship: Self    Best call back number: [phone]     Caller requesting test results: LISA    What test was performed: X-RAYS OF RIGHT KIDNEY    When was the test performed: [id number]    Where was the test performed:  [facility]    Additional notes: CALLING FOR RESULTS

## 2023-07-13 ENCOUNTER — TELEPHONE (OUTPATIENT)
Dept: FAMILY MEDICINE CLINIC | Facility: CLINIC | Age: 68
End: 2023-07-13

## 2023-07-13 NOTE — TELEPHONE ENCOUNTER
Caller: JAILYN    Relationship: Provider    Best call back number: [phone]     What form or medical record are you requesting: MOST RECENT LAB WORK RESULTS    Who is requesting this form or medical record from you: [facility]     How would you like to receive the form or medical records (pick-up, mail, fax): FAX  If fax, what is the fax number: [phone]    Timeframe paperwork needed: AS SOON AS POSSIBLE

## 2023-07-31 DIAGNOSIS — F51.01 PRIMARY INSOMNIA: ICD-10-CM

## 2023-07-31 DIAGNOSIS — F39 MOOD DISORDER: ICD-10-CM

## 2023-07-31 RX ORDER — TEMAZEPAM 30 MG/1
CAPSULE ORAL
Qty: 30 CAPSULE | Refills: 3 | Status: SHIPPED | OUTPATIENT
Start: 2023-07-31

## 2023-07-31 RX ORDER — LAMOTRIGINE 100 MG/1
TABLET ORAL
Qty: 360 TABLET | Refills: 3 | Status: SHIPPED | OUTPATIENT
Start: 2023-07-31

## 2023-08-08 ENCOUNTER — TRANSCRIBE ORDERS (OUTPATIENT)
Dept: ADMINISTRATIVE | Facility: HOSPITAL | Age: 68
End: 2023-08-08
Payer: MEDICARE

## 2023-08-08 DIAGNOSIS — N28.1 SIMPLE RENAL CYST: Primary | ICD-10-CM

## 2023-08-17 ENCOUNTER — HOSPITAL ENCOUNTER (OUTPATIENT)
Dept: CT IMAGING | Facility: HOSPITAL | Age: 68
Discharge: HOME | End: 2023-08-17
Admitting: NURSE PRACTITIONER
Payer: MEDICARE

## 2023-08-17 DIAGNOSIS — N28.1 SIMPLE RENAL CYST: ICD-10-CM

## 2023-08-17 PROCEDURE — 25510000001 IOPAMIDOL 61 % SOLUTION: Performed by: NURSE PRACTITIONER

## 2023-08-17 PROCEDURE — 74178 CT ABD&PLV WO CNTR FLWD CNTR: CPT

## 2023-08-17 RX ADMIN — IOPAMIDOL 100 ML: 612 INJECTION, SOLUTION INTRAVENOUS at 15:59

## 2023-12-06 DIAGNOSIS — F39 MOOD DISORDER: ICD-10-CM

## 2023-12-06 DIAGNOSIS — F43.23 ADJUSTMENT DISORDER WITH MIXED ANXIETY AND DEPRESSED MOOD: ICD-10-CM

## 2023-12-06 DIAGNOSIS — E78.5 DYSLIPIDEMIA: ICD-10-CM

## 2023-12-06 DIAGNOSIS — F51.01 PRIMARY INSOMNIA: ICD-10-CM

## 2023-12-06 RX ORDER — ESCITALOPRAM OXALATE 10 MG/1
10 TABLET ORAL DAILY
Qty: 90 TABLET | Refills: 3 | Status: SHIPPED | OUTPATIENT
Start: 2023-12-06

## 2023-12-06 RX ORDER — TEMAZEPAM 30 MG/1
30 CAPSULE ORAL NIGHTLY PRN
Qty: 30 CAPSULE | Refills: 3 | Status: SHIPPED | OUTPATIENT
Start: 2023-12-06

## 2023-12-06 RX ORDER — LAMOTRIGINE 200 MG/1
200 TABLET ORAL DAILY
Qty: 90 TABLET | Refills: 2 | Status: SHIPPED | OUTPATIENT
Start: 2023-12-06

## 2023-12-06 RX ORDER — EZETIMIBE 10 MG/1
10 TABLET ORAL DAILY
Qty: 90 TABLET | Refills: 3 | Status: SHIPPED | OUTPATIENT
Start: 2023-12-06

## 2023-12-06 NOTE — TELEPHONE ENCOUNTER
Rx Refill Note  Requested Prescriptions     Pending Prescriptions Disp Refills    temazepam (RESTORIL) 30 MG capsule 30 capsule 3     Signed Prescriptions Disp Refills    lamoTRIgine (LaMICtal) 200 MG tablet 90 tablet 2     Sig: Take 1 tablet by mouth Daily.     Authorizing Provider: NORAH GUTIERREZ     Ordering User: TERRIE NAVARRO    ezetimibe (ZETIA) 10 MG tablet 90 tablet 3     Sig: Take 1 tablet by mouth Daily.     Authorizing Provider: NORAH GUTIERREZ     Ordering User: TERRIE NAVARRO    escitalopram (LEXAPRO) 10 MG tablet 90 tablet 3     Sig: Take 1 tablet by mouth Daily.     Authorizing Provider: NORAH GUTIERREZ     Ordering User: TERRIE NAVARRO      Last office visit with prescribing clinician: 4/10/2023   Last telemedicine visit with prescribing clinician: Visit date not found   Next office visit with prescribing clinician: Visit date not found                         Would you like a call back once the refill request has been completed: [] Yes [] No    If the office needs to give you a call back, can they leave a voicemail: [] Yes [] No    Terrie Navarro MA  12/06/23, 14:24 EST

## 2024-02-16 ENCOUNTER — HOSPITAL ENCOUNTER (EMERGENCY)
Facility: HOSPITAL | Age: 69
Discharge: HOME | End: 2024-02-16
Attending: EMERGENCY MEDICINE
Payer: MEDICARE

## 2024-02-16 ENCOUNTER — APPOINTMENT (OUTPATIENT)
Dept: GENERAL RADIOLOGY | Facility: HOSPITAL | Age: 69
End: 2024-02-16
Payer: MEDICARE

## 2024-02-16 VITALS
BODY MASS INDEX: 33.66 KG/M2 | HEART RATE: 76 BPM | RESPIRATION RATE: 20 BRPM | SYSTOLIC BLOOD PRESSURE: 159 MMHG | OXYGEN SATURATION: 95 % | HEIGHT: 63 IN | DIASTOLIC BLOOD PRESSURE: 80 MMHG | WEIGHT: 190 LBS | TEMPERATURE: 99.2 F

## 2024-02-16 DIAGNOSIS — J06.9 VIRAL URI WITH COUGH: ICD-10-CM

## 2024-02-16 DIAGNOSIS — J10.1 INFLUENZA A: ICD-10-CM

## 2024-02-16 DIAGNOSIS — B34.9 ACUTE VIRAL SYNDROME: Primary | ICD-10-CM

## 2024-02-16 LAB
B PARAPERT DNA SPEC QL NAA+PROBE: NOT DETECTED
B PERT DNA SPEC QL NAA+PROBE: NOT DETECTED
C PNEUM DNA NPH QL NAA+NON-PROBE: NOT DETECTED
FLUAV H3 RNA NPH QL NAA+PROBE: DETECTED
FLUBV RNA ISLT QL NAA+PROBE: NOT DETECTED
HADV DNA SPEC NAA+PROBE: NOT DETECTED
HCOV 229E RNA SPEC QL NAA+PROBE: NOT DETECTED
HCOV HKU1 RNA SPEC QL NAA+PROBE: NOT DETECTED
HCOV NL63 RNA SPEC QL NAA+PROBE: NOT DETECTED
HCOV OC43 RNA SPEC QL NAA+PROBE: NOT DETECTED
HMPV RNA NPH QL NAA+NON-PROBE: NOT DETECTED
HPIV1 RNA ISLT QL NAA+PROBE: NOT DETECTED
HPIV2 RNA SPEC QL NAA+PROBE: NOT DETECTED
HPIV3 RNA NPH QL NAA+PROBE: NOT DETECTED
HPIV4 P GENE NPH QL NAA+PROBE: NOT DETECTED
M PNEUMO IGG SER IA-ACNC: NOT DETECTED
RHINOVIRUS RNA SPEC NAA+PROBE: NOT DETECTED
RSV RNA NPH QL NAA+NON-PROBE: NOT DETECTED
SARS-COV-2 RNA NPH QL NAA+NON-PROBE: NOT DETECTED

## 2024-02-16 PROCEDURE — 99283 EMERGENCY DEPT VISIT LOW MDM: CPT

## 2024-02-16 PROCEDURE — 71045 X-RAY EXAM CHEST 1 VIEW: CPT

## 2024-02-16 PROCEDURE — 0202U NFCT DS 22 TRGT SARS-COV-2: CPT | Performed by: PHYSICIAN ASSISTANT

## 2024-02-16 RX ORDER — HYDROCODONE POLISTIREX AND CHLORPHENIRAMINE POLISTIREX 10; 8 MG/5ML; MG/5ML
5 SUSPENSION, EXTENDED RELEASE ORAL ONCE
Status: COMPLETED | OUTPATIENT
Start: 2024-02-16 | End: 2024-02-16

## 2024-02-16 RX ORDER — METFORMIN HYDROCHLORIDE 500 MG/1
500 TABLET, EXTENDED RELEASE ORAL
COMMUNITY

## 2024-02-16 RX ORDER — DEXTROMETHORPHAN HYDROBROMIDE AND PROMETHAZINE HYDROCHLORIDE 15; 6.25 MG/5ML; MG/5ML
5 SYRUP ORAL 4 TIMES DAILY PRN
Qty: 100 ML | Refills: 0 | Status: SHIPPED | OUTPATIENT
Start: 2024-02-16 | End: 2024-02-21

## 2024-02-16 RX ADMIN — HYDROCODONE POLISTIREX AND CHLORPHENIRAMINE POLISTIREX 5 ML: 10; 8 SUSPENSION, EXTENDED RELEASE ORAL at 22:52

## 2024-02-17 NOTE — ED PROVIDER NOTES
EMERGENCY DEPARTMENT ENCOUNTER    Pt Name: Pura Brown  MRN: [id number]  Pt :   1955  Room Number:    Date of encounter:  2024  PCP: Tommy Garces DO  ED Provider: CEE Henriquez    Historian: Patient    HPI:  Chief Complaint: Cough    Context: Puar Brown is a 68 y.o. female who presents to the ED c/o cough and low-grade fever.     History provided by:  Patient  Cough  Cough characteristics:  Non-productive  Severity:  Moderate  Onset quality:  Gradual  Duration:  1 day  Timing:  Constant  Progression:  Unchanged  Chronicity:  New  Smoker: no    Context: sick contacts    Context comment:  Roommate has had flulike symptoms and fever for several days.  Was seen and evaluated and tested negative for COVID, influenza and strep  Relieved by:  Nothing  Ineffective treatments:  None tried  Associated symptoms: fever and shortness of breath         REVIEW OF SYSTEMS  A chief complaint appropriate review of systems was completed and is negative except as noted in the HPI.     PAST MEDICAL HISTORY  Past Medical History:   Diagnosis Date    Anxiety 2003    Arthritis     Bipolar 1 disorder     CFTR-related hereditary pancreatitis, autosomal dominant     Colitis     Depression     Diabetes mellitus 2000    Type II controlled with diet    GERD (gastroesophageal reflux disease)     Hyperlipidemia 2004    Hypertension     Obesity 1985    Osteopenia     Palpitations     Pancreatitis     Pneumonia 1973    Renal insufficiency     IgA nephropathy    Tinnitus     Tremor 1969    ET       PAST SURGICAL HISTORY  Past Surgical History:   Procedure Laterality Date    BREAST BIOPSY      BREAST LUMPECTOMY Right      SECTION      CHOLECYSTECTOMY      COLONOSCOPY      GALLBLADDER SURGERY      TUBAL ABDOMINAL LIGATION  1988       FAMILY HISTORY  Family History   Problem Relation Age of Onset    Mental illness Mother     Miscarriages / Stillbirths Mother     Vision loss Mother         Macula  Degeneration    Arthritis Father             Mental illness Maternal Grandmother     Heart disease Maternal Grandmother     Heart disease Paternal Grandmother     Cancer Paternal Grandmother        SOCIAL HISTORY  Social History     Socioeconomic History    Marital status:    Tobacco Use    Smoking status: Former     Packs/day: 0.00     Years: 4.00     Additional pack years: 0.00     Total pack years: 0.00     Types: Cigarettes     Start date: 1971     Quit date: 1975     Years since quittin.1    Smokeless tobacco: Never    Tobacco comments:     Smoked a pack a week   Vaping Use    Vaping Use: Never used   Substance and Sexual Activity    Alcohol use: Not Currently     Comment: 0    Drug use: Never    Sexual activity: Not Currently     Birth control/protection: None       ALLERGIES  Esomeprazole, Vinyl ether, Statins, Meperidine, and Nexium [esomeprazole magnesium]    PHYSICAL EXAM  Physical Exam  Vitals and nursing note reviewed.   Constitutional:       General: She is not in acute distress.     Appearance: Normal appearance. She is not ill-appearing.   HENT:      Head: Normocephalic and atraumatic.      Nose: Nose normal.      Mouth/Throat:      Mouth: Mucous membranes are moist.   Eyes:      Extraocular Movements: Extraocular movements intact.   Cardiovascular:      Rate and Rhythm: Normal rate.   Pulmonary:      Effort: Pulmonary effort is normal.      Breath sounds: Normal breath sounds.   Abdominal:      General: There is no distension.   Musculoskeletal:         General: Normal range of motion.      Cervical back: Normal range of motion and neck supple.   Skin:     General: Skin is warm and dry.   Neurological:      General: No focal deficit present.      Mental Status: She is alert.   Psychiatric:         Mood and Affect: Mood normal.         Behavior: Behavior normal.           LAB RESULTS  Results for orders placed or performed during the hospital encounter of 24    Respiratory Panel PCR w/COVID-19(SARS-CoV-2) Z[facility]B/DARSHAN/REGINALDO/[facility]/NAVARRO In-House, NP Swab in UTM/VTM, 2 HR TAT - Swab, Nasopharynx    Specimen: Nasopharynx; Swab   Result Value Ref Range    ADENOVIRUS, PCR Not Detected Not Detected    Coronavirus 229E Not Detected Not Detected    Coronavirus HKU1 Not Detected Not Detected    Coronavirus NL63 Not Detected Not Detected    Coronavirus OC43 Not Detected Not Detected    COVID19 Not Detected Not Detected - Ref. Range    Human Metapneumovirus Not Detected Not Detected    Human Rhinovirus/Enterovirus Not Detected Not Detected    Influenza A H3 Detected (A) Not Detected    Influenza B PCR Not Detected Not Detected    Parainfluenza Virus 1 Not Detected Not Detected    Parainfluenza Virus 2 Not Detected Not Detected    Parainfluenza Virus 3 Not Detected Not Detected    Parainfluenza Virus 4 Not Detected Not Detected    RSV, PCR Not Detected Not Detected    Bordetella pertussis pcr Not Detected Not Detected    Bordetella parapertussis PCR Not Detected Not Detected    Chlamydophila pneumoniae PCR Not Detected Not Detected    Mycoplasma pneumo by PCR Not Detected Not Detected       If labs were ordered, I independently reviewed the results and considered them in treating the patient.    RADIOLOGY  XR Chest 1 View   Final Result   Impression:   No acute abnormality.         Electronically Signed: Rui Hyde DO     2/16/2024 8:45 PM EST     Workstation ID: [id number]        [x] Radiologist's Report Reviewed:  I ordered and independently interpreted the above noted radiographic studies.  See radiologist's dictation for official interpretation.      PROCEDURES    Procedures    No orders to display       MEDICATIONS GIVEN IN ER    Medications   Hydrocod Pradeep-Chlorphe Pradeep ER (TUSSIONEX PENNKINETIC) 10-8 MG/5ML ER suspension 5 mL (5 mL Oral Given 2/16/24 5359)       MEDICAL DECISION MAKING, PROGRESS, and CONSULTS   Medical Decision Making  Problems Addressed:  Acute viral  syndrome: complicated acute illness or injury  Influenza A: complicated acute illness or injury  Viral URI with cough: complicated acute illness or injury    Amount and/or Complexity of Data Reviewed  Radiology: ordered.    Risk  Prescription drug management.        All labs have been independently reviewed by me.  All radiology studies have been interpreted by me and the radiologist dictating the report.  All EKG's have been independently interpreted by me as well as and overseeing attending physician.    [] Discussed with radiology regarding test interpretation:    Discussion below represents my analysis of pertinent findings related to patient's condition, differential diagnosis, treatment plan and final disposition.    Differential diagnosis:  The differential diagnosis associated with the patient's presentation includes: Viral upper respiratory infection, bacterial pneumonia, sinusitis, allergic rhinitis, influenza, COVID-19, ACS, CHF or COPD exacerbations    Additional sources  Discussed/ obtained information from independent historians:   [] Spouse  [] Parent  [] Family member  [x] Friend  [] EMS   [] Other:  External (non-ED) record review:   [] Inpatient record:   [] Office record:   [] Outpatient record:   [] Prior Outpatient labs:   [] Prior Outpatient radiology:   [] Primary Care record:   [] Outside ED record:   [] Other:   Patient's care impacted by:   [] Diabetes  [] Hypertension  [x] Hyperlipidemia  [] Hypothyroidism   [] Coronary Artery Disease   [] COPD   [] Cancer   [x] Obesity  [] GERD   [] Tobacco Abuse   [] Substance Abuse    [x] Anxiety   [x] Depression   [] Other:   Care significantly affected by Social Determinants of Health (housing and economic circumstances, unemployment)    [] Yes     [x] No   If yes, Patient's care significantly limited by  Social Determinants of Health including:   [] Inadequate housing   [] Low income   [] Alcoholism and drug addiction in family   [] Problems related  to primary support group   [] Unemployment   [] Problems related to employment   [] Other Social Determinants of Health:     Shared decision making:  I had a discussion with the patient/family regarding diagnosis, diagnostic results, treatment plan, and medications.  The patient/family indicated understanding of these instructions.  I spent adequate time at the bedside preceding discharge necessary to personally discuss the aftercare instructions, giving patient education, providing explanations of the results of our evaluations/findings, and my decision making to assure that the patient/family understand the plan of care.  Time was allotted to answer questions at that time and throughout the ED course.  Emphasis was placed on timely follow-up after discharge.  I also discussed the potential for the development of an acute emergent condition requiring further evaluation, admission, or even surgical intervention. I discussed that we found nothing during the visit today indicating the need for further workup, admission, or the presence of an unstable medical condition.  I encouraged the patient to return to the emergency department immediately for ANY concerns, worsening, new complaints, or if symptoms persist and unable to seek follow-up in a timely fashion.  The patient/family expressed understanding and agreement with this plan.  The patient will follow-up with primary care for reevaluation.      Orders placed during this visit:  Orders Placed This Encounter   Procedures    COVID PRE-OP / PRE-PROCEDURE SCREENING ORDER (NO ISOLATION) - Swab, Nasopharynx    Respiratory Panel PCR w/COVID-19(SARS-CoV-2) [facility]/DARSHAN/REGINALDO/[facility]ARRO In-House, NP Swab in UTM/VTM, 2 HR TAT - Swab, Nasopharynx    XR Chest 1 View       I considered prescription management  with:   [] Pain medication  [x] Antiviral: implemented as prescribed   [] Antibiotic   [] Other:   Rationale:    ED Course:    ED Course as of 02/19/24 1030   Fri Feb 16, 2024    2053 Imaging of chest personally interpreted by myself with official read provided by radiology demonstrated no acute cardiopulmonary process.   [JG]   2053 Vitals and Telemetry tracing was reviewed and directly interpreted by myself demonstrating blood pressure 129/80, temperature of 99.2 °F, heart rate of 78, respirations of 20 breaths/min and maintaining oxygen saturation of 96% on room air [JG]   2154 On reassessment at bedside patient resting in position of comfort in no acute distress.  Vital signs remained stable.  Oxygen saturation 96% on room air.  Results of nasopharyngeal swab pending [JG]   2235 Labs studies were reviewed and directly interpreted by myself and demonstrated nasopharyngeal swab positive for influenza A [JG]   2235 I reviewed findings of nasopharyngeal swab with patient at bedside.  Recommendations made for symptomatic care.  I also offered Tamiflu.  Patient states that she does not wish she wants to take this but will think about it.  I will send it to the pharmacy for patient should she want to initiate it tomorrow.  I indicated that she would need to start it within 72 hours of symptom onset.  Patient agreeable to plan of care, given return precautions and showed understanding. [JG]      ED Course User Index  [JG] Tejas Irizarry PA            DIAGNOSIS  Final diagnoses:   Acute viral syndrome   Viral URI with cough   Influenza A       DISPOSITION    ED Disposition       ED Disposition   Discharge    Condition   Stable    Comment   --               Please note that portions of this document were completed with voice recognition software.        Tejas Irizarry PA  02/19/24 9259

## 2024-02-17 NOTE — DISCHARGE INSTRUCTIONS
Symptomatic care is recommended with plenty of fluids, rest and Tylenol and or ibuprofen as needed for generalized body aches and fever. Take all medications as prescribed and instructed. Follow up with your primary care as directed or return to Emergency Department with worsening of symptoms.

## 2024-07-13 DIAGNOSIS — F39 MOOD DISORDER: ICD-10-CM

## 2024-07-15 RX ORDER — LAMOTRIGINE 200 MG/1
200 TABLET ORAL DAILY
Qty: 90 TABLET | Refills: 3 | Status: SHIPPED | OUTPATIENT
Start: 2024-07-15